# Patient Record
Sex: FEMALE | Race: OTHER | HISPANIC OR LATINO | Employment: OTHER | URBAN - METROPOLITAN AREA
[De-identification: names, ages, dates, MRNs, and addresses within clinical notes are randomized per-mention and may not be internally consistent; named-entity substitution may affect disease eponyms.]

---

## 2021-12-14 ENCOUNTER — APPOINTMENT (EMERGENCY)
Dept: RADIOLOGY | Facility: HOSPITAL | Age: 86
DRG: 436 | End: 2021-12-14
Payer: COMMERCIAL

## 2021-12-14 ENCOUNTER — HOSPITAL ENCOUNTER (INPATIENT)
Facility: HOSPITAL | Age: 86
LOS: 8 days | Discharge: HOME | DRG: 436 | End: 2021-12-22
Attending: EMERGENCY MEDICINE | Admitting: INTERNAL MEDICINE
Payer: COMMERCIAL

## 2021-12-14 ENCOUNTER — APPOINTMENT (EMERGENCY)
Dept: CT IMAGING | Facility: HOSPITAL | Age: 86
DRG: 436 | End: 2021-12-14
Payer: COMMERCIAL

## 2021-12-14 DIAGNOSIS — R17 JAUNDICE: Primary | ICD-10-CM

## 2021-12-14 DIAGNOSIS — K57.92 DIVERTICULITIS: ICD-10-CM

## 2021-12-14 DIAGNOSIS — R16.0 LIVER MASS: ICD-10-CM

## 2021-12-14 PROBLEM — Z86.73 HISTORY OF CVA (CEREBROVASCULAR ACCIDENT): Status: ACTIVE | Noted: 2020-08-03

## 2021-12-14 PROBLEM — R29.90 STROKE-LIKE SYMPTOMS: Status: ACTIVE | Noted: 2020-08-03

## 2021-12-14 PROBLEM — F32.A DEPRESSION: Status: ACTIVE | Noted: 2021-12-14

## 2021-12-14 PROBLEM — I48.91 ATRIAL FIBRILLATION (HCC): Status: ACTIVE | Noted: 2020-01-21

## 2021-12-14 LAB
2HR DELTA HS TROPONIN: -1 NG/L
ALBUMIN SERPL BCP-MCNC: 2.6 G/DL (ref 3.5–5)
ALP SERPL-CCNC: 771 U/L (ref 46–116)
ALT SERPL W P-5'-P-CCNC: 187 U/L (ref 12–78)
ANION GAP SERPL CALCULATED.3IONS-SCNC: 11 MMOL/L (ref 4–13)
APAP SERPL-MCNC: <2 UG/ML (ref 10–20)
AST SERPL W P-5'-P-CCNC: 155 U/L (ref 5–45)
ATRIAL RATE: 159 BPM
ATRIAL RATE: 214 BPM
BACTERIA UR QL AUTO: ABNORMAL /HPF
BASOPHILS # BLD AUTO: 0.06 THOUSANDS/ΜL (ref 0–0.1)
BASOPHILS NFR BLD AUTO: 1 % (ref 0–1)
BILIRUB DIRECT SERPL-MCNC: 5.03 MG/DL (ref 0–0.2)
BILIRUB SERPL-MCNC: 6.33 MG/DL (ref 0.2–1)
BILIRUB UR QL STRIP: ABNORMAL
BUN SERPL-MCNC: 44 MG/DL (ref 5–25)
CALCIUM SERPL-MCNC: 10.1 MG/DL (ref 8.3–10.1)
CARDIAC TROPONIN I PNL SERPL HS: 41 NG/L
CARDIAC TROPONIN I PNL SERPL HS: 42 NG/L
CHLORIDE SERPL-SCNC: 97 MMOL/L (ref 100–108)
CLARITY UR: CLEAR
CO2 SERPL-SCNC: 30 MMOL/L (ref 21–32)
COLOR UR: YELLOW
CREAT SERPL-MCNC: 0.69 MG/DL (ref 0.6–1.3)
EOSINOPHIL # BLD AUTO: 0.26 THOUSAND/ΜL (ref 0–0.61)
EOSINOPHIL NFR BLD AUTO: 2 % (ref 0–6)
ERYTHROCYTE [DISTWIDTH] IN BLOOD BY AUTOMATED COUNT: 16.2 % (ref 11.6–15.1)
GFR SERPL CREATININE-BSD FRML MDRD: 77 ML/MIN/1.73SQ M
GLUCOSE SERPL-MCNC: 117 MG/DL (ref 65–140)
GLUCOSE UR STRIP-MCNC: NEGATIVE MG/DL
HCT VFR BLD AUTO: 38.1 % (ref 34.8–46.1)
HGB BLD-MCNC: 12.2 G/DL (ref 11.5–15.4)
HGB UR QL STRIP.AUTO: ABNORMAL
IMM GRANULOCYTES # BLD AUTO: 0.18 THOUSAND/UL (ref 0–0.2)
IMM GRANULOCYTES NFR BLD AUTO: 2 % (ref 0–2)
KETONES UR STRIP-MCNC: NEGATIVE MG/DL
LEUKOCYTE ESTERASE UR QL STRIP: ABNORMAL
LYMPHOCYTES # BLD AUTO: 1.62 THOUSANDS/ΜL (ref 0.6–4.47)
LYMPHOCYTES NFR BLD AUTO: 13 % (ref 14–44)
MCH RBC QN AUTO: 28.4 PG (ref 26.8–34.3)
MCHC RBC AUTO-ENTMCNC: 32 G/DL (ref 31.4–37.4)
MCV RBC AUTO: 89 FL (ref 82–98)
MONOCYTES # BLD AUTO: 0.94 THOUSAND/ΜL (ref 0.17–1.22)
MONOCYTES NFR BLD AUTO: 8 % (ref 4–12)
NEUTROPHILS # BLD AUTO: 9.06 THOUSANDS/ΜL (ref 1.85–7.62)
NEUTS SEG NFR BLD AUTO: 74 % (ref 43–75)
NITRITE UR QL STRIP: NEGATIVE
NON-SQ EPI CELLS URNS QL MICRO: ABNORMAL /HPF
NRBC BLD AUTO-RTO: 0 /100 WBCS
NT-PROBNP SERPL-MCNC: 3168 PG/ML
PH UR STRIP.AUTO: 5 [PH] (ref 4.5–8)
PLATELET # BLD AUTO: 352 THOUSANDS/UL (ref 149–390)
PMV BLD AUTO: 11.4 FL (ref 8.9–12.7)
POTASSIUM SERPL-SCNC: 3 MMOL/L (ref 3.5–5.3)
PROT SERPL-MCNC: 8.4 G/DL (ref 6.4–8.2)
PROT UR STRIP-MCNC: NEGATIVE MG/DL
QRS AXIS: 57 DEGREES
QRS AXIS: 57 DEGREES
QRSD INTERVAL: 88 MS
QRSD INTERVAL: 90 MS
QT INTERVAL: 350 MS
QT INTERVAL: 350 MS
QTC INTERVAL: 403 MS
QTC INTERVAL: 408 MS
RBC # BLD AUTO: 4.29 MILLION/UL (ref 3.81–5.12)
RBC #/AREA URNS AUTO: ABNORMAL /HPF
SODIUM SERPL-SCNC: 138 MMOL/L (ref 136–145)
SP GR UR STRIP.AUTO: 1.01 (ref 1–1.03)
T WAVE AXIS: 104 DEGREES
T WAVE AXIS: 106 DEGREES
UROBILINOGEN UR QL STRIP.AUTO: 4 E.U./DL
VENTRICULAR RATE: 80 BPM
VENTRICULAR RATE: 82 BPM
WBC # BLD AUTO: 12.12 THOUSAND/UL (ref 4.31–10.16)
WBC #/AREA URNS AUTO: ABNORMAL /HPF

## 2021-12-14 PROCEDURE — 85025 COMPLETE CBC W/AUTO DIFF WBC: CPT | Performed by: EMERGENCY MEDICINE

## 2021-12-14 PROCEDURE — 36415 COLL VENOUS BLD VENIPUNCTURE: CPT | Performed by: EMERGENCY MEDICINE

## 2021-12-14 PROCEDURE — 71046 X-RAY EXAM CHEST 2 VIEWS: CPT

## 2021-12-14 PROCEDURE — 93005 ELECTROCARDIOGRAM TRACING: CPT

## 2021-12-14 PROCEDURE — 83880 ASSAY OF NATRIURETIC PEPTIDE: CPT | Performed by: EMERGENCY MEDICINE

## 2021-12-14 PROCEDURE — G1004 CDSM NDSC: HCPCS

## 2021-12-14 PROCEDURE — 99285 EMERGENCY DEPT VISIT HI MDM: CPT

## 2021-12-14 PROCEDURE — 80048 BASIC METABOLIC PNL TOTAL CA: CPT | Performed by: EMERGENCY MEDICINE

## 2021-12-14 PROCEDURE — 84484 ASSAY OF TROPONIN QUANT: CPT | Performed by: EMERGENCY MEDICINE

## 2021-12-14 PROCEDURE — 80076 HEPATIC FUNCTION PANEL: CPT | Performed by: EMERGENCY MEDICINE

## 2021-12-14 PROCEDURE — 74177 CT ABD & PELVIS W/CONTRAST: CPT

## 2021-12-14 PROCEDURE — 99285 EMERGENCY DEPT VISIT HI MDM: CPT | Performed by: EMERGENCY MEDICINE

## 2021-12-14 PROCEDURE — 99223 1ST HOSP IP/OBS HIGH 75: CPT | Performed by: INTERNAL MEDICINE

## 2021-12-14 PROCEDURE — 93010 ELECTROCARDIOGRAM REPORT: CPT | Performed by: INTERNAL MEDICINE

## 2021-12-14 PROCEDURE — 80143 DRUG ASSAY ACETAMINOPHEN: CPT | Performed by: EMERGENCY MEDICINE

## 2021-12-14 PROCEDURE — 81001 URINALYSIS AUTO W/SCOPE: CPT

## 2021-12-14 RX ORDER — CHOLECALCIFEROL (VITAMIN D3) 125 MCG
5 CAPSULE ORAL
COMMUNITY

## 2021-12-14 RX ORDER — ESCITALOPRAM OXALATE 10 MG/1
5 TABLET ORAL
Status: DISCONTINUED | OUTPATIENT
Start: 2021-12-14 | End: 2021-12-22 | Stop reason: HOSPADM

## 2021-12-14 RX ORDER — LANOLIN ALCOHOL/MO/W.PET/CERES
5 CREAM (GRAM) TOPICAL
Status: DISCONTINUED | OUTPATIENT
Start: 2021-12-14 | End: 2021-12-22 | Stop reason: HOSPADM

## 2021-12-14 RX ORDER — ONDANSETRON 2 MG/ML
4 INJECTION INTRAMUSCULAR; INTRAVENOUS EVERY 6 HOURS PRN
Status: DISCONTINUED | OUTPATIENT
Start: 2021-12-14 | End: 2021-12-22 | Stop reason: HOSPADM

## 2021-12-14 RX ORDER — ASPIRIN 81 MG/1
81 TABLET ORAL DAILY
COMMUNITY

## 2021-12-14 RX ORDER — ASPIRIN 81 MG/1
81 TABLET ORAL DAILY
Status: DISCONTINUED | OUTPATIENT
Start: 2021-12-15 | End: 2021-12-22 | Stop reason: HOSPADM

## 2021-12-14 RX ORDER — HYDROCHLOROTHIAZIDE 12.5 MG/1
1 TABLET ORAL DAILY
COMMUNITY
Start: 2021-11-23

## 2021-12-14 RX ORDER — POTASSIUM CHLORIDE 20 MEQ/1
40 TABLET, EXTENDED RELEASE ORAL ONCE
Status: COMPLETED | OUTPATIENT
Start: 2021-12-14 | End: 2021-12-14

## 2021-12-14 RX ORDER — METRONIDAZOLE 500 MG/1
500 TABLET ORAL EVERY 8 HOURS SCHEDULED
Status: DISCONTINUED | OUTPATIENT
Start: 2021-12-15 | End: 2021-12-22 | Stop reason: HOSPADM

## 2021-12-14 RX ORDER — ATORVASTATIN CALCIUM 10 MG/1
40 TABLET, FILM COATED ORAL DAILY
COMMUNITY
End: 2021-12-22 | Stop reason: HOSPADM

## 2021-12-14 RX ORDER — CHLORAL HYDRATE 500 MG
1 CAPSULE ORAL DAILY
COMMUNITY
Start: 2016-10-13

## 2021-12-14 RX ORDER — ESCITALOPRAM OXALATE 5 MG/1
5 TABLET ORAL
COMMUNITY
Start: 2021-11-19 | End: 2022-11-19

## 2021-12-14 RX ORDER — ATORVASTATIN CALCIUM 40 MG/1
40 TABLET, FILM COATED ORAL EVERY EVENING
Status: DISCONTINUED | OUTPATIENT
Start: 2021-12-15 | End: 2021-12-22 | Stop reason: HOSPADM

## 2021-12-14 RX ORDER — METRONIDAZOLE 500 MG/1
500 TABLET ORAL ONCE
Status: COMPLETED | OUTPATIENT
Start: 2021-12-14 | End: 2021-12-14

## 2021-12-14 RX ORDER — HYDROCHLOROTHIAZIDE 12.5 MG/1
12.5 TABLET ORAL DAILY
Status: DISCONTINUED | OUTPATIENT
Start: 2021-12-15 | End: 2021-12-22 | Stop reason: HOSPADM

## 2021-12-14 RX ADMIN — METOPROLOL TARTRATE 25 MG: 25 TABLET, FILM COATED ORAL at 22:28

## 2021-12-14 RX ADMIN — MELATONIN 4.5 MG: at 22:24

## 2021-12-14 RX ADMIN — POTASSIUM CHLORIDE 40 MEQ: 1500 TABLET, EXTENDED RELEASE ORAL at 22:24

## 2021-12-14 RX ADMIN — IOHEXOL 100 ML: 350 INJECTION, SOLUTION INTRAVENOUS at 15:29

## 2021-12-14 RX ADMIN — CEFEPIME HYDROCHLORIDE 2000 MG: 2 INJECTION, POWDER, FOR SOLUTION INTRAVENOUS at 17:31

## 2021-12-14 RX ADMIN — APIXABAN 2.5 MG: 2.5 TABLET, FILM COATED ORAL at 22:23

## 2021-12-14 RX ADMIN — METRONIDAZOLE 500 MG: 500 TABLET ORAL at 17:31

## 2021-12-14 RX ADMIN — ESCITALOPRAM OXALATE 5 MG: 10 TABLET ORAL at 22:24

## 2021-12-14 NOTE — ASSESSMENT & PLAN NOTE
This is an 41-year-old female with history of hypertension, atrial fibrillation, CVA, presenting with jaundice  Patient was most recently admitted at [facility] on 11/17/2021 with facial droop and confusion found to have acute right MCA stroke  Patient was not a candidate for tPA and thrombectomy  Patient was resumed on her Eliquis with no residual deficit noted  Family noted patient to be jaundiced over the last several days  Patient also having generalized abdominal tenderness, denies any nausea or vomiting  Denies any fever, chills      · CT revealed lobulated soft tissue mass centrally within the liver with additional smaller satellite lesions, no definite primary site of tumor is identified, may suggest cholangiocarcinoma  · Acetaminophen level normal  · Discussed with gastroenterology team, will continue with clear liquid diet, patient may need further workup with ERCP however GI team to discuss further with family  · Trend LFTs    Results from last 7 days   Lab Units 12/14/21  1407   AST U/L 155*   ALT U/L 187*   TOTAL BILIRUBIN mg/dL 6 33*

## 2021-12-14 NOTE — H&P
[address] [facility] 1933, 80 y o  female MRN: [id number]  Unit/Bed#: ED 31 Encounter: [id number]  Primary Care Provider: Jeremias Ortiz DO   Date and time admitted to hospital: 12/14/2021  1:10 PM    * Jaundice  Assessment & Plan  This is an 80-year-old female with history of hypertension, atrial fibrillation, CVA, presenting with jaundice  Patient was most recently admitted at [facility] on 11/17/2021 with facial droop and confusion found to have acute right MCA stroke  Patient was not a candidate for tPA and thrombectomy  Patient was resumed on her Eliquis with no residual deficit noted  Family noted patient to be jaundiced over the last several days  Patient also having generalized abdominal tenderness, denies any nausea or vomiting  Denies any fever, chills  · CT revealed lobulated soft tissue mass centrally within the liver with additional smaller satellite lesions, no definite primary site of tumor is identified, may suggest cholangiocarcinoma  · Acetaminophen level normal  · Discussed with gastroenterology team, will continue with clear liquid diet, patient may need further workup with ERCP however GI team to discuss further with family  · Trend LFTs    Results from last 7 days   Lab Units 12/14/21  1407   AST U/L 155*   ALT U/L 187*   TOTAL BILIRUBIN mg/dL 6 33*       Depression  Assessment & Plan  · Continue with escitalopram    Diverticulitis  Assessment & Plan  · CT scan revealed multiple colonic diverticula with infiltration of fat and sigmoid mesentery suggestive of diverticulitis, no evidence of perforation or abscess  · Continue with ceftriaxone and metronidazole  · Tolerating clear liquid diet    History of CVA (cerebrovascular accident)  Assessment & Plan  · Recent right MCA infarct at [facility] 11/2021  · No residual deficits  · Maintained on aspirin, Eliquis, statin    Essential hypertension  Assessment & Plan  · Continue metoprolol 25 mg b i d   And hydrochlorothiazide 12 5 mg daily    Atrial fibrillation (HCC)  Assessment & Plan  · Maintained on metoprolol for rate control  · Eliquis for anticoagulation        VTE Prophylaxis: Apixaban (Eliquis)  / sequential compression device   Code Status: FULL  POLST: There is no POLST form on file for this patient (pre-hospital)    Anticipated Length of Stay:  Patient will be admitted on an Inpatient basis with an anticipated length of stay of  Greater than 2 midnights  Justification for Hospital Stay: jaundice    Total Time for Visit, including Counseling / Coordination of Care: 45 minutes  Greater than 50% of this total time spent on direct patient counseling and coordination of care  Chief Complaint:   jaundice    History of Present Illness:    Zan Gibbs is a 80 y o  female who presents with jaundice  Patient hashistory of hypertension, atrial fibrillation, CVA, presenting with jaundice  Patient was most recently admitted at [facility] on 11/17/2021 with facial droop and confusion found to have acute right MCA stroke  Patient was not a candidate for tPA and thrombectomy  Patient was resumed on her Eliquis with no residual deficit noted  Family noted patient to be jaundiced over the last several days  Patient also having generalized abdominal tenderness, denies any nausea or vomiting  Denies any fever, chills  Review of Systems:    Review of Systems   Constitutional: Negative  HENT: Negative  Eyes: Negative  Respiratory: Negative  Cardiovascular: Negative  Gastrointestinal: Positive for abdominal pain  Endocrine: Negative  Genitourinary: Negative  Musculoskeletal: Negative  Skin: Positive for color change  Allergic/Immunologic: Negative  Neurological: Negative  Hematological: Negative  Psychiatric/Behavioral: Negative          Past Medical and Surgical History:     Past Medical History:   Diagnosis Date    Hypertension     Stroke [facility])        Past Surgical History: Procedure Laterality Date    CARDIAC PACEMAKER PLACEMENT         Meds/Allergies:    Prior to Admission medications    Medication Sig Start Date End Date Taking? Authorizing Provider   apixaban (Eliquis) 2 5 mg Take 2 5 mg by mouth 2 (two) times a day   Yes Historical Provider, MD   aspirin (ECOTRIN LOW STRENGTH) 81 mg EC tablet Take 81 mg by mouth daily   Yes Historical Provider, MD   atorvastatin (LIPITOR) 10 mg tablet Take 40 mg by mouth daily   Yes Historical Provider, MD   escitalopram (LEXAPRO) 5 mg tablet Take 5 mg by mouth daily at bedtime 11/19/21 11/19/22 Yes Historical Provider, MD   hydrochlorothiazide (HYDRODIURIL) 12 5 mg tablet Take 1 tablet by mouth daily 11/23/21  Yes Historical Provider, MD   Melatonin 5 MG TABS Take 5 mg by mouth daily at bedtime   Yes Historical Provider, MD   metoprolol tartrate (LOPRESSOR) 25 mg tablet Take 1 tablet by mouth 2 (two) times a day 11/2/21  Yes Historical Provider, MD   [place]-3 1000 MG CAPS Take 1 capsule by mouth daily 10/13/16  Yes Historical Provider, MD   psyllium (METAMUCIL) 0 52 g capsule Take 0 52 g by mouth daily   Yes Historical Provider, MD     I have reviewed home medications with patient family member  Allergies: Allergies   Allergen Reactions    Penicillins Hives       Social History:     Social History     Substance and Sexual Activity   Alcohol Use Never     Social History     Tobacco Use   Smoking Status Never Smoker   Smokeless Tobacco Never Used     Social History     Substance and Sexual Activity   Drug Use Never       Family History:    History reviewed  No pertinent family history      Physical Exam:     Vitals:   Blood Pressure: 155/65 (12/14/21 1602)  Pulse: 90 (12/14/21 1602)  Temperature: 97 9 °F (36 6 °C) (12/14/21 1331)  Temp Source: Oral (12/14/21 1331)  Respirations: 16 (12/14/21 1602)  SpO2: 93 % (12/14/21 1602)    Constitutional: Patient is oriented to person, place and time, no acute distress  HEENT:  Normocephalic, atraumatic, scleral icterus  Cardiovascular: Normal S1S2, RRR, No murmurs/rubs/gallops appreciated  Pulmonary:  Bilateral air entry, No rhonchi/rales/wheezing appreciated  Abdominal: Soft, Bowel sounds present, mild diffuse tenderness  Extremities:  No cyanosis, clubbing or edema  Neurological: Cranial nerves II-XII grossly intact, sensation intact, otherwise no focal neurological symptoms  Skin:  Scattered jaundice    Additional Data:     Lab Results: I have personally reviewed pertinent reports  Results from last 7 days   Lab Units 12/14/21  1407   WBC Thousand/uL 12 12*   HEMOGLOBIN g/dL 12 2   HEMATOCRIT % 38 1   PLATELETS Thousands/uL 352   NEUTROS PCT % 74   LYMPHS PCT % 13*   MONOS PCT % 8   EOS PCT % 2     Results from last 7 days   Lab Units 12/14/21  1407   POTASSIUM mmol/L 3 0*   CHLORIDE mmol/L 97*   CO2 mmol/L 30   BUN mg/dL 44*   CREATININE mg/dL 0 69   CALCIUM mg/dL 10 1   ALK PHOS U/L 771*   ALT U/L 187*   AST U/L 155*           Imaging: I have personally reviewed pertinent reports  CT abdomen pelvis with contrast    Result Date: 12/14/2021  Narrative: CT ABDOMEN AND PELVIS WITH IV CONTRAST INDICATION:   jaundice, back pain  COMPARISON:  None  TECHNIQUE:  CT examination of the abdomen and pelvis was performed  Axial, sagittal, and coronal 2D reformatted images were created from the source data and submitted for interpretation  Radiation dose length product (DLP) for this visit:  712 mGy-cm   This examination, like all CT scans performed in the [facility], was performed utilizing techniques to minimize radiation dose exposure, including the use of iterative reconstruction and automated exposure control  IV Contrast:  100 mL of iohexol (OMNIPAQUE) Enteric Contrast:  Enteric contrast was not administered   FINDINGS: ABDOMEN LOWER CHEST:  No clinically significant abnormality identified in the visualized lower chest  LIVER/BILIARY TREE:  There is a lobulated mass in segment 4 of the liver which measures approximately 6 1 x 4 1 cm  Additional smaller satellite lesions are also present in the left lateral segment and probable smaller lesions in segment 5 on images 27 and 30 of series 2  The largest lesion shows some filling with contrast on the delayed images  There is intrahepatic biliary ductal dilatation mostly in the left lateral segment of the liver  GALLBLADDER:  Gallstones  SPLEEN:  Unremarkable  PANCREAS:  Unremarkable  ADRENAL GLANDS:  Unremarkable  KIDNEYS/URETERS:  Unremarkable  No hydronephrosis  STOMACH AND BOWEL:  There is a curvilinear radiopaque density in the rectum best shown on series 2 images 55 through 61 and series 602 image 554 of uncertain etiology  No free air or evidence of perforation  Multiple colonic diverticula are present throughout the entire length of the colon, with infiltration of the fat in the sigmoid mesial colon, best shown on series 2 image 48  Small bowel is normal in caliber  APPENDIX:  No findings to suggest appendicitis  ABDOMINOPELVIC CAVITY:  No ascites  No pneumoperitoneum  No lymphadenopathy  VESSELS:  Atherosclerotic changes are present  No evidence of aneurysm  PELVIS REPRODUCTIVE ORGANS:  Nearly 3 cm probable fibroid in the left uterine fundus  URINARY BLADDER:  Unremarkable  ABDOMINAL WALL/INGUINAL REGIONS:  Fat-containing umbilical hernia  There is an anterior abdominal wall lipoma in the left lower quadrant between the internal and external oblique muscles  OSSEOUS STRUCTURES:  Degenerative changes in spine  No destructive skeletal lesions  Impression: 1  Lobulated low-attenuation soft tissue mass centrally within the liver with additional smaller satellite lesions  No definite primary site of tumor is identified  The enhancement pattern and presence of intrahepatic biliary ductal dilatation is nonspecific but might suggest cholangiocarcinoma as a possibility   2   Intraluminal curvilinear radiopaque foreign body at the rectosigmoid junction of uncertain etiology  No evidence of perforation  3   Multiple colonic diverticula, with infiltration of the fat in the sigmoid mesentery suggestive of diverticulitis  No colon mass is identified  No evidence of perforation or abscess  Workstation performed: [id number]       EKG, Pathology, and Other Studies Reviewed on Admission:   · EKG:  Atrial fibrillation    Allscripts / Epic Records Reviewed: Yes     ** Please Note: This note has been constructed using a voice recognition system   **

## 2021-12-14 NOTE — ED PROVIDER NOTES
History  Chief Complaint   Patient presents with    Jaundice     pt arrives via ems per report pt was noted by snf staff jaundice, dark urine and altered mental status  pt c/o sob, pt alert and oriented  An 77-year-old female with past medical history of brain aneurysm, CVA, prediabetes, cardiomyopathy, atrial fibrillation (on Eliquis), heart failure, hypertension and tachy-indigo syndrome s/p pacemaker; presents with jaundice  History is provided predominantly from the patient's son who is at bedside and providing translation  Son states that patient's physical therapist arrived to the house today and noticed her to be jaundice which prompted ED evaluation  Son states today is the first day the jaundice was noted  Patient does complain of generalized back pain, however felt it was secondary to increased activity with physical therapy  Patient also notes chest pain and dyspnea on exertion, also stating this occurs with activity  Patient otherwise denies fever, chills, headache, abdominal pain, abdominal distension, nausea, vomiting, diarrhea, peripheral edema and rashes  Of note, patient was admitted to [facility] from 11/17-19 for an acute CVA  Patient was discharged home to live with her granddaughter, reporting that she has been doing very well with home therapy  A/P:  Jaundice, associated with back pain  Mild generalized abdominal tenderness  Concern for obstructive pathology, predominantly pancreatic malignancy  Less likely toxic in nature, however patient does report taking Tylenol daily  Will check lab work including LFTs and acetaminophen level  Will check CTAP for further evaluation  History provided by:  Patient, relative and medical records   used: Yes (family member)        Prior to Admission Medications   Prescriptions Last Dose Informant Patient Reported? Taking?    Melatonin 5 MG TABS   Yes Yes   Sig: Take 5 mg by mouth daily at bedtime   [place]-3 1000 MG CAPS   Yes Yes   Sig: Take 1 capsule by mouth daily   apixaban (Eliquis) 2 5 mg   Yes Yes   Sig: Take 2 5 mg by mouth 2 (two) times a day   aspirin (ECOTRIN LOW STRENGTH) 81 mg EC tablet   Yes Yes   Sig: Take 81 mg by mouth daily   atorvastatin (LIPITOR) 10 mg tablet   Yes Yes   Sig: Take 40 mg by mouth daily   escitalopram (LEXAPRO) 5 mg tablet   Yes Yes   Sig: Take 5 mg by mouth daily at bedtime   hydrochlorothiazide (HYDRODIURIL) 12 5 mg tablet   Yes Yes   Sig: Take 1 tablet by mouth daily   metoprolol tartrate (LOPRESSOR) 25 mg tablet   Yes Yes   Sig: Take 1 tablet by mouth 2 (two) times a day   psyllium (METAMUCIL) 0 52 g capsule   Yes Yes   Sig: Take 0 52 g by mouth daily      Facility-Administered Medications: None       Past Medical History:   Diagnosis Date    Hypertension     Stroke [facility])        Past Surgical History:   Procedure Laterality Date    CARDIAC PACEMAKER PLACEMENT         History reviewed  No pertinent family history  I have reviewed and agree with the history as documented  E-Cigarette/Vaping    E-Cigarette Use Never User      E-Cigarette/Vaping Substances     Social History     Tobacco Use    Smoking status: Never Smoker    Smokeless tobacco: Never Used   Vaping Use    Vaping Use: Never used   Substance Use Topics    Alcohol use: Never    Drug use: Never       Review of Systems   Respiratory: Positive for shortness of breath  Cardiovascular: Positive for chest pain  Musculoskeletal: Positive for back pain  Skin: Positive for color change  All other systems reviewed and are negative  Physical Exam  Physical Exam  General Appearance: alert and oriented, nad, non toxic appearing  Skin:  Warm, dry  Jaundice appreciated  HEENT: atraumatic, normocephalic    Scleral icterus  Neck: Supple, trachea midline  Cardiac: RRR; no murmurs, rub, gallops  Pulmonary: lungs CTAB; no wheezes, rales, rhonchi  Gastrointestinal: abdomen soft, mild generalized tenderness, nondistended; no guarding or rebound tenderness; good bowel sounds, no mass or bruits  Extremities:  Midline thoracic and lumbar spinal tenderness    2+ pitting edema to bilateral lower extremities, 2+ pulses; no calf tenderness, no clubbing, no cyanosis  Neuro:  no focal motor or sensory deficits, CN 2-12 grossly intact  Psych:  Normal mood and affect, normal judgement and insight      Vital Signs  ED Triage Vitals [12/14/21 1331]   Temperature Pulse Respirations Blood Pressure SpO2   97 9 °F (36 6 °C) 72 16 161/74 93 %      Temp Source Heart Rate Source Patient Position - Orthostatic VS BP Location FiO2 (%)   Oral Monitor Sitting Right arm --      Pain Score       No Pain           Vitals:    12/14/21 1331 12/14/21 1602   BP: 161/74 155/65   Pulse: 72 90   Patient Position - Orthostatic VS: Sitting Lying         Visual Acuity      ED Medications  Medications   cefepime (MAXIPIME) 2 g/50 mL dextrose IVPB (2,000 mg Intravenous New Bag 12/14/21 1731)   iohexol (OMNIPAQUE) 350 MG/ML injection (SINGLE-DOSE) 100 mL (100 mL Intravenous Given 12/14/21 1529)   metroNIDAZOLE (FLAGYL) tablet 500 mg (500 mg Oral Given 12/14/21 1731)       Diagnostic Studies  Results Reviewed     Procedure Component Value Units Date/Time    Urine Macroscopic, POC [[id number]]  (Abnormal) Collected: 12/14/21 1744    Lab Status: Final result Specimen: Urine Updated: 12/14/21 1745     Color, UA Yellow     Clarity, UA Clear     pH, UA 5 0     Leukocytes, UA Trace     Nitrite, UA Negative     Protein, UA Negative mg/dl      Glucose, UA Negative mg/dl      Ketones, UA Negative mg/dl      Urobilinogen, UA 4 0 E U /dl      Bilirubin, UA Interference- unable to analyze     Blood, UA Trace     Specific Jet, UA 1 010    Narrative:      CLINITEK RESULT    Urine Microscopic [[id number]] Collected: 12/14/21 1744    Lab Status: No result Specimen: Urine, Clean Catch     HS Troponin I 2hr [[id number]] Collected: 12/14/21 1602    Lab Status: Final result Specimen: Blood from Arm, Left Updated: 12/14/21 1637     hs TnI 2hr 41 ng/L      Delta 2hr hsTnI -1 ng/L     HS Troponin I 4hr [[id number]]     Lab Status: No result Specimen: Blood     Acetaminophen level-"If concentration is detectable, please discuss with medical  on call " [[id number]]  (Abnormal) Collected: 12/14/21 1407    Lab Status: Final result Specimen: Blood from Arm, Left Updated: 12/14/21 1455     Acetaminophen Level <2 ug/mL     Hepatic function panel [[id number]]  (Abnormal) Collected: 12/14/21 1407    Lab Status: Final result Specimen: Blood from Arm, Left Updated: 12/14/21 1450     Total Bilirubin 6 33 mg/dL      Bilirubin, Direct 5 03 mg/dL      Alkaline Phosphatase 771 U/L       U/L       U/L      Total Protein 8 4 g/dL      Albumin 2 6 g/dL     NT-BNP PRO [[id number]]  (Abnormal) Collected: 12/14/21 1407    Lab Status: Final result Specimen: Blood from Arm, Left Updated: 12/14/21 1450     NT-proBNP 3,168 pg/mL     HS Troponin 0hr (reflex protocol) [[id number]]  (Normal) Collected: 12/14/21 1407    Lab Status: Final result Specimen: Blood from Arm, Left Updated: 12/14/21 1436     hs TnI 0hr 42 ng/L     Basic metabolic panel [[id number]]  (Abnormal) Collected: 12/14/21 1407    Lab Status: Final result Specimen: Blood from Arm, Left Updated: 12/14/21 1435     Sodium 138 mmol/L      Potassium 3 0 mmol/L      Chloride 97 mmol/L      CO2 30 mmol/L      ANION GAP 11 mmol/L      BUN 44 mg/dL      Creatinine 0 69 mg/dL      Glucose 117 mg/dL      Calcium 10 1 mg/dL      eGFR 77 ml/min/1 73sq m     Narrative:      [place] guidelines for Chronic Kidney Disease (CKD):     Stage 1 with normal or high GFR (GFR > 90 mL/min/1 73 square meters)    Stage 2 Mild CKD (GFR = 60-89 mL/min/1 73 square meters)    Stage 3A Moderate CKD (GFR = 45-59 mL/min/1 73 square meters)    Stage 3B Moderate CKD (GFR = 30-44 mL/min/1 73 square meters)    Stage 4 Severe CKD (GFR = 15-29 mL/min/1 73 square meters)    Stage 5 End Stage CKD (GFR <15 mL/min/1 73 square meters)  Note: GFR calculation is accurate only with a steady state creatinine    CBC and differential [[id number]]  (Abnormal) Collected: 12/14/21 1407    Lab Status: Final result Specimen: Blood from Arm, Left Updated: 12/14/21 1412     WBC 12 12 Thousand/uL      RBC 4 29 Million/uL      Hemoglobin 12 2 g/dL      Hematocrit 38 1 %      MCV 89 fL      MCH 28 4 pg      MCHC 32 0 g/dL      RDW 16 2 %      MPV 11 4 fL      Platelets 839 Thousands/uL      nRBC 0 /100 WBCs      Neutrophils Relative 74 %      Immat GRANS % 2 %      Lymphocytes Relative 13 %      Monocytes Relative 8 %      Eosinophils Relative 2 %      Basophils Relative 1 %      Neutrophils Absolute 9 06 Thousands/µL      Immature Grans Absolute 0 18 Thousand/uL      Lymphocytes Absolute 1 62 Thousands/µL      Monocytes Absolute 0 94 Thousand/µL      Eosinophils Absolute 0 26 Thousand/µL      Basophils Absolute 0 06 Thousands/µL                  CT abdomen pelvis with contrast   Final Result by Billie Shen MD (12/14 7671)         1  Lobulated low-attenuation soft tissue mass centrally within the liver with additional smaller satellite lesions  No definite primary site of tumor is identified  The enhancement pattern and presence of intrahepatic biliary ductal dilatation is    nonspecific but might suggest cholangiocarcinoma as a possibility  2   Intraluminal curvilinear radiopaque foreign body at the rectosigmoid junction of uncertain etiology  No evidence of perforation  3   Multiple colonic diverticula, with infiltration of the fat in the sigmoid mesentery suggestive of diverticulitis  No colon mass is identified  No evidence of perforation or abscess        Workstation performed: [id number]         XR chest 2 views   ED Interpretation by Fabio Howell DO (12/14 1371)   Cardiomegaly, otherwise no acute disease                 Procedures  Procedures   ECG 12 Lead Documentation  Date/Time: today/date: 12/14/2021  Performed by: Lashanda Medley    ECG reviewed by me, the ED Provider: yes    Patient location:  ED   Previous ECG:  No old for comparison   Rate:  80  ECG rate assessment: normal    Rhythm: atrial fibrillation    Ectopy:  none    QRS axis:  Normal  Intervals: normal   Q waves: None   ST segments:  Normal  T waves: normal      Impression: Atrial fibrillation, otherwise normal EKG          ED Course  ED Course as of 12/14/21 1755   Tue Dec 14, 2021   1457 TOTAL BILIRUBIN(!): 6 33  Direct bilirubin 5 03, with elevated LFT's  Acetaminophen undetectable  Likely obstructive pathology   1645 Delta 2hr hsTnI: -1   1647 CT abdomen pelvis with contrast  1 ) Lobulated low-attenuation soft tissue mass centrally within the liver with additional smaller satellite lesions  No definite primary site of tumor is identified  The enhancement pattern and presence of intrahepatic biliary ductal dilatation is nonspecific but might suggest cholangiocarcinoma as a possibility  2 ) Intraluminal curvilinear radiopaque foreign body at the rectosigmoid junction of uncertain etiology  No evidence of perforation  3 ) Multiple colonic diverticula, with infiltration of the fat in the sigmoid mesentery suggestive of diverticulitis  No colon mass is identified  No evidence of perforation or abscess  65 Pt and son updated on CT results, agreeable to admission for further evaluation/treatment  In regards to diverticulitis on CT scan, pt denies LLQ abd pain however is locally tender to LLQ  Will also treat for diverticulitis  Pt denies history of abdominal surgeries, unclear etiology of possible foreign body  [address], Dr Antonio Joshua, discussing pt's presentation and results  Will admit to their service  [address] [place] [address] with Dr Chris Smith, GI fellow, reviewing presentation and work up  Plan for ERCP tomorrow, will make NPO at midnight  SLIM updated  MDM    Disposition  Final diagnoses:   Jaundice   Liver mass   Diverticulitis     Time reflects when diagnosis was documented in both MDM as applicable and the Disposition within this note     Time User Action Codes Description Comment    12/14/2021  5:25 PM Seda Moore Add [R17] Jaundice     12/14/2021  5:25 PM Seda Moore Add [R16 0] Liver mass     12/14/2021  5:25 PM Inés Rdz Add [K57 92] Diverticulitis       ED Disposition     ED Disposition Condition Date/Time Comment    Admit Stable Tue Dec 14, 2021  5:25 PM Case was discussed with FABIOLA and the patient's admission status was agreed to be Admission Status: inpatient status to the service of Dr Subha Perez   Follow-up Information    None         Patient's Medications   Discharge Prescriptions    No medications on file       No discharge procedures on file      PDMP Review     None          ED Provider  Electronically Signed by           Autumn Lazo DO  12/14/21 6142

## 2021-12-14 NOTE — ASSESSMENT & PLAN NOTE
· CT scan revealed multiple colonic diverticula with infiltration of fat and sigmoid mesentery suggestive of diverticulitis, no evidence of perforation or abscess  · Continue with ceftriaxone and metronidazole  · Tolerating clear liquid diet

## 2021-12-15 LAB
4HR DELTA HS TROPONIN: 0 NG/L
ALBUMIN SERPL BCP-MCNC: 2.1 G/DL (ref 3.5–5)
ALP SERPL-CCNC: 623 U/L (ref 46–116)
ALT SERPL W P-5'-P-CCNC: 131 U/L (ref 12–78)
ANION GAP SERPL CALCULATED.3IONS-SCNC: 11 MMOL/L (ref 4–13)
AST SERPL W P-5'-P-CCNC: 106 U/L (ref 5–45)
BASOPHILS # BLD AUTO: 0.04 THOUSANDS/ΜL (ref 0–0.1)
BASOPHILS NFR BLD AUTO: 0 % (ref 0–1)
BILIRUB SERPL-MCNC: 4.81 MG/DL (ref 0.2–1)
BUN SERPL-MCNC: 39 MG/DL (ref 5–25)
CALCIUM ALBUM COR SERPL-MCNC: 11.1 MG/DL (ref 8.3–10.1)
CALCIUM SERPL-MCNC: 9.6 MG/DL (ref 8.3–10.1)
CARDIAC TROPONIN I PNL SERPL HS: 42 NG/L
CHLORIDE SERPL-SCNC: 102 MMOL/L (ref 100–108)
CO2 SERPL-SCNC: 27 MMOL/L (ref 21–32)
CREAT SERPL-MCNC: 0.69 MG/DL (ref 0.6–1.3)
EOSINOPHIL # BLD AUTO: 0.18 THOUSAND/ΜL (ref 0–0.61)
EOSINOPHIL NFR BLD AUTO: 2 % (ref 0–6)
ERYTHROCYTE [DISTWIDTH] IN BLOOD BY AUTOMATED COUNT: 16.5 % (ref 11.6–15.1)
GFR SERPL CREATININE-BSD FRML MDRD: 77 ML/MIN/1.73SQ M
GLUCOSE SERPL-MCNC: 96 MG/DL (ref 65–140)
HCT VFR BLD AUTO: 32.5 % (ref 34.8–46.1)
HGB BLD-MCNC: 10.5 G/DL (ref 11.5–15.4)
IMM GRANULOCYTES # BLD AUTO: 0.12 THOUSAND/UL (ref 0–0.2)
IMM GRANULOCYTES NFR BLD AUTO: 1 % (ref 0–2)
LYMPHOCYTES # BLD AUTO: 1.5 THOUSANDS/ΜL (ref 0.6–4.47)
LYMPHOCYTES NFR BLD AUTO: 14 % (ref 14–44)
MCH RBC QN AUTO: 27.5 PG (ref 26.8–34.3)
MCHC RBC AUTO-ENTMCNC: 32.3 G/DL (ref 31.4–37.4)
MCV RBC AUTO: 85 FL (ref 82–98)
MONOCYTES # BLD AUTO: 0.87 THOUSAND/ΜL (ref 0.17–1.22)
MONOCYTES NFR BLD AUTO: 8 % (ref 4–12)
NEUTROPHILS # BLD AUTO: 7.87 THOUSANDS/ΜL (ref 1.85–7.62)
NEUTS SEG NFR BLD AUTO: 75 % (ref 43–75)
NRBC BLD AUTO-RTO: 0 /100 WBCS
PLATELET # BLD AUTO: 329 THOUSANDS/UL (ref 149–390)
PMV BLD AUTO: 10.6 FL (ref 8.9–12.7)
POTASSIUM SERPL-SCNC: 3.7 MMOL/L (ref 3.5–5.3)
PROT SERPL-MCNC: 6.9 G/DL (ref 6.4–8.2)
RBC # BLD AUTO: 3.82 MILLION/UL (ref 3.81–5.12)
SODIUM SERPL-SCNC: 140 MMOL/L (ref 136–145)
WBC # BLD AUTO: 10.58 THOUSAND/UL (ref 4.31–10.16)

## 2021-12-15 PROCEDURE — 85025 COMPLETE CBC W/AUTO DIFF WBC: CPT | Performed by: INTERNAL MEDICINE

## 2021-12-15 PROCEDURE — 99232 SBSQ HOSP IP/OBS MODERATE 35: CPT | Performed by: HOSPITALIST

## 2021-12-15 PROCEDURE — 99223 1ST HOSP IP/OBS HIGH 75: CPT | Performed by: INTERNAL MEDICINE

## 2021-12-15 PROCEDURE — 84484 ASSAY OF TROPONIN QUANT: CPT | Performed by: INTERNAL MEDICINE

## 2021-12-15 PROCEDURE — 80053 COMPREHEN METABOLIC PANEL: CPT | Performed by: INTERNAL MEDICINE

## 2021-12-15 RX ORDER — ACETAMINOPHEN 325 MG/1
975 TABLET ORAL EVERY 6 HOURS PRN
Status: DISCONTINUED | OUTPATIENT
Start: 2021-12-15 | End: 2021-12-15

## 2021-12-15 RX ORDER — IBUPROFEN 400 MG/1
400 TABLET ORAL ONCE
Status: COMPLETED | OUTPATIENT
Start: 2021-12-15 | End: 2021-12-15

## 2021-12-15 RX ADMIN — ESCITALOPRAM OXALATE 5 MG: 10 TABLET ORAL at 21:05

## 2021-12-15 RX ADMIN — METRONIDAZOLE 500 MG: 500 TABLET ORAL at 21:05

## 2021-12-15 RX ADMIN — ATORVASTATIN CALCIUM 40 MG: 40 TABLET, FILM COATED ORAL at 17:55

## 2021-12-15 RX ADMIN — ASPIRIN 81 MG: 81 TABLET, COATED ORAL at 10:04

## 2021-12-15 RX ADMIN — METRONIDAZOLE 500 MG: 500 TABLET ORAL at 14:18

## 2021-12-15 RX ADMIN — CEFTRIAXONE SODIUM 1000 MG: 10 INJECTION, POWDER, FOR SOLUTION INTRAVENOUS at 00:57

## 2021-12-15 RX ADMIN — MELATONIN 4.5 MG: at 21:05

## 2021-12-15 RX ADMIN — METRONIDAZOLE 500 MG: 500 TABLET ORAL at 05:39

## 2021-12-15 RX ADMIN — METOPROLOL TARTRATE 25 MG: 25 TABLET, FILM COATED ORAL at 17:55

## 2021-12-15 RX ADMIN — IBUPROFEN 400 MG: 400 TABLET ORAL at 21:23

## 2021-12-15 RX ADMIN — HYDROCHLOROTHIAZIDE 12.5 MG: 12.5 TABLET ORAL at 10:04

## 2021-12-15 RX ADMIN — APIXABAN 2.5 MG: 2.5 TABLET, FILM COATED ORAL at 10:04

## 2021-12-15 RX ADMIN — METOPROLOL TARTRATE 25 MG: 25 TABLET, FILM COATED ORAL at 10:04

## 2021-12-15 NOTE — UTILIZATION REVIEW
Initial Clinical Review    Admission: Date/Time/Statement:   Admission Orders (From admission, onward)     Ordered        12/14/21 1726  Inpatient Admission  Once                      Orders Placed This Encounter   Procedures    Inpatient Admission     Standing Status:   Standing     Number of Occurrences:   1     Order Specific Question:   Level of Care     Answer:   Med Surg [16]     Order Specific Question:   Estimated length of stay     Answer:   More than 2 Midnights     Order Specific Question:   Certification     Answer:   I certify that inpatient services are medically necessary for this patient for a duration of greater than two midnights  See H&P and MD Progress Notes for additional information about the patient's course of treatment  ED Arrival Information     Expected Arrival Acuity    - 12/14/2021 13:03 Urgent         Means of arrival Escorted by Service Admission type    [facility] ([address]) Hospitalist Urgent         Arrival complaint    medical problem        Chief Complaint   Patient presents with    Jaundice     pt arrives via ems per report pt was noted by snf staff jaundice, dark urine and altered mental status  pt c/o sob, pt alert and oriented  Initial Presentation: 69-year-old female with history of hypertension, atrial fibrillation, CVA dx 11/2021 on Eliquis, presenting to ED  from home via EMS with jaundice x several days the patient reports generalized abdominal tenderness  On exam, abdomen soft, bowel sounds notes, mild diffuse abdominal tenderness  Labs-, , alk phos 771, and t bili 6 33  Potassium 3 0  Acetaminophen level normal  WBC 12 12  CTA/P reveals lobulated mass within liver with smaller satellite lesions suggestive of cholangiocarcinoma, also suggestive of diverticulitis     Pt given IV/po abx in ED  Pt admitted as Inpatient with jaundice , elevate LFT's, diverticulitis  Plan - GI consult, trend LFT's, CL diet  IV ceftriaxone, po Flagyl  Date: 12/15  Day 2:   GI -jaundice, lesions throughout her liver with ductal dilation, and elevated liver enzymes :, , alk phos 623, and t bili 4 81-downtrending from admission  Possible next steps of MRCP or ERCP after attending discusses CT findings with family  If proceed with ERCP, need to hold Eliquis x 2 days  Continue abx for diverticulitis  Medicine-Pt continues with pain all over her abdomen but feels it is tolerable  Tender to palpation over liver, skin jaundiced  Hold eliquis for upcoming procedure- Waiting for GI opinion on how to proceed with either MRCP, ERCP or other biopsy  Pt remains on CL diet  WBC 10 58, pt afebrile  ED Triage Vitals [12/14/21 1331]   Temperature Pulse Respirations Blood Pressure SpO2   97 9 °F (36 6 °C) 72 16 161/74 93 %      Temp Source Heart Rate Source Patient Position - Orthostatic VS BP Location FiO2 (%)   Oral Monitor Sitting Right arm --      Pain Score       No Pain          Wt Readings from Last 1 Encounters:   10/13/16 69 5 kg (153 lb 4 oz)     Additional Vital Signs:   Date/Time Temp Pulse Resp BP MAP (mmHg) SpO2   12/15/21 07:32:21 97 8 °F (36 6 °C) 99 18 131/62 85 95 %   12/14/21 22:40:16 97 8 °F (36 6 °C) 95 18 128/60 83 96 %   12/14/21 22:24:59 -- 104 -- 133/56 82 95 %   12/14/21 19:55:53 97 7 °F (36 5 °C) 80 16 124/61 82 93 %   12/14/21 19:55:01 97 7 °F (36 5 °C) 94 16 124/61 82 94 %   12/14/21 1602 -- 90 16 155/65 -- 93 %     Pertinent Labs/Diagnostic Test Results:   12/14  ECGAtrial fibrillation with premature ventricular or aberrantly conducted complexes  Nonspecific ST abnormality  V rate -80  CXR-No acute cardiopulmonary disease  CT Abdomen-1  Lobulated low-attenuation soft tissue mass centrally within the liver with additional smaller satellite lesions  No definite primary site of tumor is identified    The enhancement pattern and presence of intrahepatic biliary ductal dilatation is nonspecific but might suggest cholangiocarcinoma as a possibility  2   Intraluminal curvilinear radiopaque foreign body at the rectosigmoid junction of uncertain etiology  No evidence of perforation  3   Multiple colonic diverticula, with infiltration of the fat in the sigmoid mesentery suggestive of diverticulitis  No colon mass is identified  No evidence of perforation or abscess          Results from last 7 days   Lab Units 12/15/21  0448 12/14/21  1407   WBC Thousand/uL 10 58* 12 12*   HEMOGLOBIN g/dL 10 5* 12 2   HEMATOCRIT % 32 5* 38 1   PLATELETS Thousands/uL 329 352   NEUTROS ABS Thousands/µL 7 87* 9 06*         Results from last 7 days   Lab Units 12/15/21  0448 12/14/21  1407   SODIUM mmol/L 140 138   POTASSIUM mmol/L 3 7 3 0*   CHLORIDE mmol/L 102 97*   CO2 mmol/L 27 30   ANION GAP mmol/L 11 11   BUN mg/dL 39* 44*   CREATININE mg/dL 0 69 0 69   EGFR ml/min/1 73sq m 77 77   CALCIUM mg/dL 9 6 10 1     Results from last 7 days   Lab Units 12/15/21  0448 12/14/21  1407   AST U/L 106* 155*   ALT U/L 131* 187*   ALK PHOS U/L 623* 771*   TOTAL PROTEIN g/dL 6 9 8 4*   ALBUMIN g/dL 2 1* 2 6*   TOTAL BILIRUBIN mg/dL 4 81* 6 33*   BILIRUBIN DIRECT mg/dL  --  5 03*         Results from last 7 days   Lab Units 12/15/21  0448 12/14/21  1407   GLUCOSE RANDOM mg/dL 96 117               Results from last 7 days   Lab Units 12/15/21  0448 12/14/21  1602 12/14/21  1407   HS TNI 0HR ng/L  --   --  42   HS TNI 2HR ng/L  --  41  --    HSTNI D2 ng/L  --  -1  --    HS TNI 4HR ng/L 42  --   --    HSTNI D4 ng/L 0  --   --                Results from last 7 days   Lab Units 12/14/21  1407   NT-PRO BNP pg/mL 3,168*           Results from last 7 days   Lab Units 12/14/21  1744   CLARITY UA  Clear   COLOR UA  Yellow   SPEC GRAV UA  1 010   PH UA  5 0   GLUCOSE UA mg/dl Negative   KETONES UA mg/dl Negative   BLOOD UA  Trace*   PROTEIN UA mg/dl Negative   NITRITE UA  Negative   BILIRUBIN UA  Interference- unable to analyze*   UROBILINOGEN UA E U /dl 4 0* LEUKOCYTES UA  Trace*   WBC UA /hpf 1-2*   RBC UA /hpf 1-2*   BACTERIA UA /hpf Occasional   EPITHELIAL CELLS WET PREP /hpf Occasional                 Results from last 7 days   Lab Units 12/14/21  1407   ACETAMINOPHEN LVL ug/mL <2*                 ED Treatment:   Medication Administration from 12/14/2021 1303 to 12/14/2021 1946       Date/Time Order Dose Route Action     12/14/2021 1529 iohexol (OMNIPAQUE) 350 MG/ML injection (SINGLE-DOSE) 100 mL 100 mL Intravenous Given     12/14/2021 1731 cefepime (MAXIPIME) 2 g/50 mL dextrose IVPB 2,000 mg Intravenous New Bag     12/14/2021 1731 metroNIDAZOLE (FLAGYL) tablet 500 mg 500 mg Oral Given        Past Medical History:   Diagnosis Date    Hypertension     Stroke [facility])      Present on Admission:   Atrial fibrillation ([facility] [address] )   Essential hypertension      Admitting Diagnosis: Diverticulitis [K57 92]  Jaundice [R17]  Liver mass [R16 0]  Age/Sex: 80 y o  female  Admission Orders:  Scheduled Medications:  aspirin, 81 mg, Oral, Daily  atorvastatin, 40 mg, Oral, QPM  cefTRIAXone, 1,000 mg, Intravenous, Q24H  escitalopram, 5 mg, Oral, HS  hydrochlorothiazide, 12 5 mg, Oral, Daily  melatonin, 4 5 mg, Oral, HS  metoprolol tartrate, 25 mg, Oral, BID  metroNIDAZOLE, 500 mg, Oral, Q8H ONEAL  potassium chloride (K-DUR,KLOR-CON) CR tablet 40 mEq  Dose: 40 mEq  Freq: Once Route: PO  Start: 12/14/21 1945 End: 12/14/21 2224  apixaban (ELIQUIS) tablet 2 5 mg  Dose: 2 5 mg  Freq: 2 times daily Route: PO  Start: 12/14/21 2145 End: 12/15/21 1313  Continuous IV Infusions:     PRN Meds:  ondansetron, 4 mg, Intravenous, Q6H PRN    SCD's   CL diet 12/15  OOB    IP CONSULT TO GASTROENTEROLOGY    Network Utilization Review Department  ATTENTION: Please call with any questions or concerns to [phone] and carefully listen to the prompts so that you are directed to the right person   All voicemails are confidential   Karol Rdz all requests for admission clinical reviews, approved or denied determinations and any other requests to dedicated fax number below belonging to the campus where the patient is receiving treatment   List of dedicated fax numbers for the Facilities:  [address] [place] [address] DENIALS (Administrative/Medical Necessity) [phone]   [address] (Maternity/NICU/Pediatrics) [phone]   [address]  [address] [facility] [place] [address] [address] [place] [address] [address] [facility] [place] [address] [phone]

## 2021-12-15 NOTE — UTILIZATION REVIEW
Inpatient Admission Authorization Request   NOTIFICATION OF INPATIENT ADMISSION/INPATIENT AUTHORIZATION REQUEST   SERVICING FACILITY:   [address] [place] [address], [facility], [facility]  Tax ID: [id number]  NPI: [id number]  Place of Service: Inpatient [address] [facility]  [address]  Place of Service Code: 24     ATTENDING PROVIDER:  Attending Name and NPI#: Lizeth Dubois [[id number]]  Address: [address] [place] [address], [facility], [facility]  Phone: [phone]     UTILIZATION REVIEW CONTACT:  Nery Brenner, Utilization   Network Utilization Review Department  Phone: [phone]  Fax: [phone]  Email: [email or web address]     PHYSICIAN ADVISORY SERVICES:  FOR [id number] REVIEW - MEDICAL NECESSITY DENIAL  Phone: [phone]  Fax: [phone]  Email: [email or web address]     TYPE OF REQUEST:  Inpatient Status     ADMISSION INFORMATION:  ADMISSION DATE/TIME: 12/14/21  5:26 PM  PATIENT DIAGNOSIS CODE/DESCRIPTION:  Diverticulitis [K57 92]  Jaundice [R17]  Liver mass [R16 0]  DISCHARGE DATE/TIME: No discharge date for patient encounter  DISCHARGE DISPOSITION (IF DISCHARGED): Final discharge disposition not confirmed     IMPORTANT INFORMATION:  Please contact the Nery Brenner directly with any questions or concerns regarding this request  Department voicemails are confidential     Send requests for admission clinical reviews, concurrent reviews, approvals, and administrative denials due to lack of clinical to fax [phone]

## 2021-12-15 NOTE — H&P (VIEW-ONLY)
Consultation - [facility]  Rachelle Almanzar 80 y o  female MRN: [id number]  Unit/Bed#: E5 -01 Encounter: [id number]        Inpatient consult to gastroenterology  Consult performed by: eGorge Downing PA-C  Consult ordered by: Erica Salguero MD      Reason for Consult / Principal Problem:     Jaundice  Liver mass      ASSESSMENT AND PLAN:      31-year-old female with a past medical history of hypertension, AFib, CVA, on Eliquis, who presents with painless jaundice  1  Jaundice  2  Liver lesion  3  Transaminitis   The patient presents with jaundice, lesions throughout her liver with ductal dilation, and elevated liver enzymes (, , alk phos 623, and t bili 4 81)    - attending to review CT scan with radiology and family, next steps (possibly MRCP or ERCP) to be determined after that, if we proceed with ERCP Eliquis will need to be help for 2 full days prior     4  Abnormal CT scan  5  Diverticulitis  CT also revealed an intraluminal curvilinear radiopaque foreign body at the rectosigmoid junction of uncertain etiology, and diverticulitis without mass, perforation, or abscess  - continue antibiotics   - further workup pending discussion as above     ______________________________________________________________________    HPI:  31-year-old female with a past medical history of hypertension, AFib, CVA, on Eliquis, who presents with jaundice  The family noticed this over the past few days  She does admit to some generalized abdominal tenderness   CT abdomen and pelvis with contrast obtained and revealing lobulated low-attenuation soft tissue mass centrally within the liver with additional smaller satellite lesions without primary tumor site identified, intrahepatic biliary ductal dilatation mostly in the left lateral segment of the liver, an intraluminal curvilinear radiopaque foreign body at the rectosigmoid junction of uncertain etiology, and diverticulitis without mass, perforation, or abscess  LFTs are elevated with an , , alk phos 623, and t bili 4 81  REVIEW OF SYSTEMS:    CONSTITUTIONAL: Denies any fever, chills, rigors, and weight loss  HEENT: No earache or tinnitus  Denies hearing loss or visual disturbances  CARDIOVASCULAR: No chest pain or palpitations  RESPIRATORY: Denies any cough, hemoptysis, shortness of breath or dyspnea on exertion  GASTROINTESTINAL: As noted in the History of Present Illness  GENITOURINARY: No problems with urination  Denies any hematuria or dysuria  NEUROLOGIC: No dizziness or vertigo, denies headaches  MUSCULOSKELETAL: Denies any muscle or joint pain  SKIN: Denies skin rashes or itching  ENDOCRINE: Denies excessive thirst  Denies intolerance to heat or cold  PSYCHOSOCIAL: Denies depression or anxiety  Denies any recent memory loss  Historical Information   Past Medical History:   Diagnosis Date    Hypertension     Stroke [facility])      Past Surgical History:   Procedure Laterality Date    CARDIAC PACEMAKER PLACEMENT       Social History   Social History     Substance and Sexual Activity   Alcohol Use Never     Social History     Substance and Sexual Activity   Drug Use Never     Social History     Tobacco Use   Smoking Status Never Smoker   Smokeless Tobacco Never Used     History reviewed  No pertinent family history      Meds/Allergies     Medications Prior to Admission   Medication    apixaban (Eliquis) 2 5 mg    aspirin (ECOTRIN LOW STRENGTH) 81 mg EC tablet    atorvastatin (LIPITOR) 10 mg tablet    escitalopram (LEXAPRO) 5 mg tablet    hydrochlorothiazide (HYDRODIURIL) 12 5 mg tablet    Melatonin 5 MG TABS    metoprolol tartrate (LOPRESSOR) 25 mg tablet    Omega-3 1000 MG CAPS    psyllium (METAMUCIL) 0 52 g capsule     Current Facility-Administered Medications   Medication Dose Route Frequency    apixaban (ELIQUIS) tablet 2 5 mg  2 5 mg Oral BID    aspirin (ECOTRIN LOW STRENGTH) EC tablet 81 mg 81 mg Oral Daily    atorvastatin (LIPITOR) tablet 40 mg  40 mg Oral QPM    cefTRIAXone (ROCEPHIN) 1,000 mg in dextrose 5 % 50 mL IVPB  1,000 mg Intravenous Q24H    escitalopram (LEXAPRO) tablet 5 mg  5 mg Oral HS    hydrochlorothiazide (HYDRODIURIL) tablet 12 5 mg  12 5 mg Oral Daily    melatonin tablet 4 5 mg  4 5 mg Oral HS    metoprolol tartrate (LOPRESSOR) tablet 25 mg  25 mg Oral BID    metroNIDAZOLE (FLAGYL) tablet 500 mg  500 mg Oral Q8H [facility]    ondansetron (ZOFRAN) injection 4 mg  4 mg Intravenous Q6H PRN       Allergies   Allergen Reactions    Penicillins Hives           Objective     Blood pressure 128/60, pulse 95, temperature 97 8 °F (36 6 °C), resp  rate 18, SpO2 96 %  There is no height or weight on file to calculate BMI  Intake/Output Summary (Last 24 hours) at 12/15/2021 0724  Last data filed at 12/14/2021 1927  Gross per 24 hour   Intake 50 ml   Output --   Net 50 ml         PHYSICAL EXAM:      General Appearance:   Alert, cooperative, no distress   HEENT:   Normocephalic, atraumatic, anicteric      Neck:  Supple, symmetrical, trachea midline   Lungs:   Clear to auscultation bilaterally; no rales, rhonchi or wheezing; respirations unlabored    Heart[de-identified]   Regular rate and rhythm; no murmur, rub, or gallop     Abdomen:   Soft, non-tender, non-distended; normal bowel sounds; no masses, no organomegaly    Genitalia:   Deferred    Rectal:   Deferred    Extremities:  No cyanosis, clubbing or edema    Skin:  + jaundice, no rashes or lesions          Lab Results:   Admission on 12/14/2021   Component Date Value    WBC 12/14/2021 12 12*    RBC 12/14/2021 4 29     Hemoglobin 12/14/2021 12 2     Hematocrit 12/14/2021 38 1     MCV 12/14/2021 89     MCH 12/14/2021 28 4     MCHC 12/14/2021 32 0     RDW 12/14/2021 16 2*    MPV 12/14/2021 11 4     Platelets 47/84/4615 352     nRBC 12/14/2021 0     Neutrophils Relative 12/14/2021 74     Immat GRANS % 12/14/2021 2     Lymphocytes Relative 12/14/2021 13*    Monocytes Relative 12/14/2021 8     Eosinophils Relative 12/14/2021 2     Basophils Relative 12/14/2021 1     Neutrophils Absolute 12/14/2021 9 06*    Immature Grans Absolute 12/14/2021 0 18     Lymphocytes Absolute 12/14/2021 1 62     Monocytes Absolute 12/14/2021 0 94     Eosinophils Absolute 12/14/2021 0 26     Basophils Absolute 12/14/2021 0 06     Sodium 12/14/2021 138     Potassium 12/14/2021 3 0*    Chloride 12/14/2021 97*    CO2 12/14/2021 30     ANION GAP 12/14/2021 11     BUN 12/14/2021 44*    Creatinine 12/14/2021 0 69     Glucose 12/14/2021 117     Calcium 12/14/2021 10 1     eGFR 12/14/2021 77     Total Bilirubin 12/14/2021 6 33*    Bilirubin, Direct 12/14/2021 5 03*    Alkaline Phosphatase 12/14/2021 771*    AST 12/14/2021 155*    ALT 12/14/2021 187*    Total Protein 12/14/2021 8 4*    Albumin 12/14/2021 2 6*    Acetaminophen Level 12/14/2021 <2*    NT-proBNP 12/14/2021 3,168*    hs TnI 0hr 12/14/2021 42     hs TnI 2hr 12/14/2021 41     Delta 2hr hsTnI 12/14/2021 -1     hs TnI 4hr 12/15/2021 42     Delta 4hr hsTnI 12/15/2021 0     Ventricular Rate 12/14/2021 80     Atrial Rate 12/14/2021 214     QRSD Interval 12/14/2021 90     QT Interval 12/14/2021 350     QTC Interval 12/14/2021 403     QRS Axis 12/14/2021 57     T Wave Axis 12/14/2021 104     Ventricular Rate 12/14/2021 82     Atrial Rate 12/14/2021 159     QRSD Interval 12/14/2021 88     QT Interval 12/14/2021 350     QTC Interval 12/14/2021 408     QRS Axis 12/14/2021 57     T Wave Axis 12/14/2021 106     Color, UA 12/14/2021 Yellow     Clarity, UA 12/14/2021 Clear     pH, UA 12/14/2021 5 0     Leukocytes, UA 12/14/2021 Trace*    Nitrite, UA 12/14/2021 Negative     Protein, UA 12/14/2021 Negative     Glucose, UA 12/14/2021 Negative     Ketones, UA 12/14/2021 Negative     Urobilinogen, UA 12/14/2021 4 0*    Bilirubin, UA 12/14/2021 Interference- unable to analyze*    Blood, UA 12/14/2021 Trace*    Specific Davey, UA 12/14/2021 1 010     RBC, UA 12/14/2021 1-2*    WBC, UA 12/14/2021 1-2*    Epithelial Cells 12/14/2021 Occasional     Bacteria, UA 12/14/2021 Occasional     Sodium 12/15/2021 140     Potassium 12/15/2021 3 7     Chloride 12/15/2021 102     CO2 12/15/2021 27     ANION GAP 12/15/2021 11     BUN 12/15/2021 39*    Creatinine 12/15/2021 0 69     Glucose 12/15/2021 96     Calcium 12/15/2021 9 6     Corrected Calcium 12/15/2021 11 1*    AST 12/15/2021 106*    ALT 12/15/2021 131*    Alkaline Phosphatase 12/15/2021 623*    Total Protein 12/15/2021 6 9     Albumin 12/15/2021 2 1*    Total Bilirubin 12/15/2021 4 81*    eGFR 12/15/2021 77     WBC 12/15/2021 10 58*    RBC 12/15/2021 3 82     Hemoglobin 12/15/2021 10 5*    Hematocrit 12/15/2021 32 5*    MCV 12/15/2021 85     MCH 12/15/2021 27 5     MCHC 12/15/2021 32 3     RDW 12/15/2021 16 5*    MPV 12/15/2021 10 6     Platelets 19/96/5939 329     nRBC 12/15/2021 0     Neutrophils Relative 12/15/2021 75     Immat GRANS % 12/15/2021 1     Lymphocytes Relative 12/15/2021 14     Monocytes Relative 12/15/2021 8     Eosinophils Relative 12/15/2021 2     Basophils Relative 12/15/2021 0     Neutrophils Absolute 12/15/2021 7 87*    Immature Grans Absolute 12/15/2021 0 12     Lymphocytes Absolute 12/15/2021 1 50     Monocytes Absolute 12/15/2021 0 87     Eosinophils Absolute 12/15/2021 0 18     Basophils Absolute 12/15/2021 0 04        Imaging Studies: I have personally reviewed pertinent imaging studies

## 2021-12-15 NOTE — PLAN OF CARE
Problem: Potential for Falls  Goal: Patient will remain free of falls  Description: INTERVENTIONS:  - Educate patient/family on patient safety including physical limitations  - Instruct patient to call for assistance with activity   - Consult OT/PT to assist with strengthening/mobility   - Keep Call bell within reach  - Keep bed low and locked with side rails adjusted as appropriate  - Keep care items and personal belongings within reach  - Initiate and maintain comfort rounds  - Make Fall Risk Sign visible to staff  - Offer Toileting every 2  Hours, in advance of need  - Initiate/Maintain  bed alarm  - Obtain necessary fall risk management equipment:   - Apply yellow socks and bracelet for high fall risk patients  - Consider moving patient to room near nurses station  Outcome: Progressing

## 2021-12-15 NOTE — ASSESSMENT & PLAN NOTE
Relatively painless jaundice    Concerning for either liver cancer or cholangiocarcinoma    Waiting for GI opinion on how to proceed with either MRCP, ERCP or other biopsy    She is comfortable

## 2021-12-15 NOTE — CONSULTS
Consultation - [facility]  Hao Marrufo 80 y o  female MRN: [id number]  Unit/Bed#: E5 -01 Encounter: [id number]        Inpatient consult to gastroenterology  Consult performed by: Sharlee Dakins, PA-C  Consult ordered by: Yoselin Moncada MD      Reason for Consult / Principal Problem:     Jaundice  Liver mass      ASSESSMENT AND PLAN:      80-year-old female with a past medical history of hypertension, AFib, CVA, on Eliquis, who presents with painless jaundice  1  Jaundice  2  Liver lesion  3  Transaminitis   The patient presents with jaundice, lesions throughout her liver with ductal dilation, and elevated liver enzymes (, , alk phos 623, and t bili 4 81)    - attending to review CT scan with radiology and family, next steps (possibly MRCP or ERCP) to be determined after that, if we proceed with ERCP Eliquis will need to be help for 2 full days prior     4  Abnormal CT scan  5  Diverticulitis  CT also revealed an intraluminal curvilinear radiopaque foreign body at the rectosigmoid junction of uncertain etiology, and diverticulitis without mass, perforation, or abscess  - continue antibiotics   - further workup pending discussion as above     ______________________________________________________________________    HPI:  80-year-old female with a past medical history of hypertension, AFib, CVA, on Eliquis, who presents with jaundice  The family noticed this over the past few days  She does admit to some generalized abdominal tenderness   CT abdomen and pelvis with contrast obtained and revealing lobulated low-attenuation soft tissue mass centrally within the liver with additional smaller satellite lesions without primary tumor site identified, intrahepatic biliary ductal dilatation mostly in the left lateral segment of the liver, an intraluminal curvilinear radiopaque foreign body at the rectosigmoid junction of uncertain etiology, and diverticulitis without mass, perforation, or abscess  LFTs are elevated with an , , alk phos 623, and t bili 4 81  REVIEW OF SYSTEMS:    CONSTITUTIONAL: Denies any fever, chills, rigors, and weight loss  HEENT: No earache or tinnitus  Denies hearing loss or visual disturbances  CARDIOVASCULAR: No chest pain or palpitations  RESPIRATORY: Denies any cough, hemoptysis, shortness of breath or dyspnea on exertion  GASTROINTESTINAL: As noted in the History of Present Illness  GENITOURINARY: No problems with urination  Denies any hematuria or dysuria  NEUROLOGIC: No dizziness or vertigo, denies headaches  MUSCULOSKELETAL: Denies any muscle or joint pain  SKIN: Denies skin rashes or itching  ENDOCRINE: Denies excessive thirst  Denies intolerance to heat or cold  PSYCHOSOCIAL: Denies depression or anxiety  Denies any recent memory loss  Historical Information   Past Medical History:   Diagnosis Date    Hypertension     Stroke [facility])      Past Surgical History:   Procedure Laterality Date    CARDIAC PACEMAKER PLACEMENT       Social History   Social History     Substance and Sexual Activity   Alcohol Use Never     Social History     Substance and Sexual Activity   Drug Use Never     Social History     Tobacco Use   Smoking Status Never Smoker   Smokeless Tobacco Never Used     History reviewed  No pertinent family history      Meds/Allergies     Medications Prior to Admission   Medication    apixaban (Eliquis) 2 5 mg    aspirin (ECOTRIN LOW STRENGTH) 81 mg EC tablet    atorvastatin (LIPITOR) 10 mg tablet    escitalopram (LEXAPRO) 5 mg tablet    hydrochlorothiazide (HYDRODIURIL) 12 5 mg tablet    Melatonin 5 MG TABS    metoprolol tartrate (LOPRESSOR) 25 mg tablet    Omega-3 1000 MG CAPS    psyllium (METAMUCIL) 0 52 g capsule     Current Facility-Administered Medications   Medication Dose Route Frequency    apixaban (ELIQUIS) tablet 2 5 mg  2 5 mg Oral BID    aspirin (ECOTRIN LOW STRENGTH) EC tablet 81 mg 81 mg Oral Daily    atorvastatin (LIPITOR) tablet 40 mg  40 mg Oral QPM    cefTRIAXone (ROCEPHIN) 1,000 mg in dextrose 5 % 50 mL IVPB  1,000 mg Intravenous Q24H    escitalopram (LEXAPRO) tablet 5 mg  5 mg Oral HS    hydrochlorothiazide (HYDRODIURIL) tablet 12 5 mg  12 5 mg Oral Daily    melatonin tablet 4 5 mg  4 5 mg Oral HS    metoprolol tartrate (LOPRESSOR) tablet 25 mg  25 mg Oral BID    metroNIDAZOLE (FLAGYL) tablet 500 mg  500 mg Oral Q8H [address]    ondansetron (ZOFRAN) injection 4 mg  4 mg Intravenous Q6H PRN       Allergies   Allergen Reactions    Penicillins Hives           Objective     Blood pressure 128/60, pulse 95, temperature 97 8 °F (36 6 °C), resp  rate 18, SpO2 96 %  There is no height or weight on file to calculate BMI  Intake/Output Summary (Last 24 hours) at 12/15/2021 0724  Last data filed at 12/14/2021 1927  Gross per 24 hour   Intake 50 ml   Output --   Net 50 ml         PHYSICAL EXAM:      General Appearance:   Alert, cooperative, no distress   HEENT:   Normocephalic, atraumatic, anicteric      Neck:  Supple, symmetrical, trachea midline   Lungs:   Clear to auscultation bilaterally; no rales, rhonchi or wheezing; respirations unlabored    Heart[de-identified]   Regular rate and rhythm; no murmur, rub, or gallop     Abdomen:   Soft, non-tender, non-distended; normal bowel sounds; no masses, no organomegaly    Genitalia:   Deferred    Rectal:   Deferred    Extremities:  No cyanosis, clubbing or edema    Skin:  + jaundice, no rashes or lesions          Lab Results:   Admission on 12/14/2021   Component Date Value    WBC 12/14/2021 12 12*    RBC 12/14/2021 4 29     Hemoglobin 12/14/2021 12 2     Hematocrit 12/14/2021 38 1     MCV 12/14/2021 89     MCH 12/14/2021 28 4     MCHC 12/14/2021 32 0     RDW 12/14/2021 16 2*    MPV 12/14/2021 11 4     Platelets 02/44/7128 352     nRBC 12/14/2021 0     Neutrophils Relative 12/14/2021 74     Immat GRANS % 12/14/2021 2     Lymphocytes Relative 12/14/2021 13*    Monocytes Relative 12/14/2021 8     Eosinophils Relative 12/14/2021 2     Basophils Relative 12/14/2021 1     Neutrophils Absolute 12/14/2021 9 06*    Immature Grans Absolute 12/14/2021 0 18     Lymphocytes Absolute 12/14/2021 1 62     Monocytes Absolute 12/14/2021 0 94     Eosinophils Absolute 12/14/2021 0 26     Basophils Absolute 12/14/2021 0 06     Sodium 12/14/2021 138     Potassium 12/14/2021 3 0*    Chloride 12/14/2021 97*    CO2 12/14/2021 30     ANION GAP 12/14/2021 11     BUN 12/14/2021 44*    Creatinine 12/14/2021 0 69     Glucose 12/14/2021 117     Calcium 12/14/2021 10 1     eGFR 12/14/2021 77     Total Bilirubin 12/14/2021 6 33*    Bilirubin, Direct 12/14/2021 5 03*    Alkaline Phosphatase 12/14/2021 771*    AST 12/14/2021 155*    ALT 12/14/2021 187*    Total Protein 12/14/2021 8 4*    Albumin 12/14/2021 2 6*    Acetaminophen Level 12/14/2021 <2*    NT-proBNP 12/14/2021 3,168*    hs TnI 0hr 12/14/2021 42     hs TnI 2hr 12/14/2021 41     Delta 2hr hsTnI 12/14/2021 -1     hs TnI 4hr 12/15/2021 42     Delta 4hr hsTnI 12/15/2021 0     Ventricular Rate 12/14/2021 80     Atrial Rate 12/14/2021 214     QRSD Interval 12/14/2021 90     QT Interval 12/14/2021 350     QTC Interval 12/14/2021 403     QRS Axis 12/14/2021 57     T Wave Axis 12/14/2021 104     Ventricular Rate 12/14/2021 82     Atrial Rate 12/14/2021 159     QRSD Interval 12/14/2021 88     QT Interval 12/14/2021 350     QTC Interval 12/14/2021 408     QRS Axis 12/14/2021 57     T Wave Axis 12/14/2021 106     Color, UA 12/14/2021 Yellow     Clarity, UA 12/14/2021 Clear     pH, UA 12/14/2021 5 0     Leukocytes, UA 12/14/2021 Trace*    Nitrite, UA 12/14/2021 Negative     Protein, UA 12/14/2021 Negative     Glucose, UA 12/14/2021 Negative     Ketones, UA 12/14/2021 Negative     Urobilinogen, UA 12/14/2021 4 0*    Bilirubin, UA 12/14/2021 Interference- unable to analyze*    Blood, UA 12/14/2021 Trace*    Specific [place], UA 12/14/2021 1 010     RBC, UA 12/14/2021 1-2*    WBC, UA 12/14/2021 1-2*    Epithelial Cells 12/14/2021 Occasional     Bacteria, UA 12/14/2021 Occasional     Sodium 12/15/2021 140     Potassium 12/15/2021 3 7     Chloride 12/15/2021 102     CO2 12/15/2021 27     ANION GAP 12/15/2021 11     BUN 12/15/2021 39*    Creatinine 12/15/2021 0 69     Glucose 12/15/2021 96     Calcium 12/15/2021 9 6     Corrected Calcium 12/15/2021 11 1*    AST 12/15/2021 106*    ALT 12/15/2021 131*    Alkaline Phosphatase 12/15/2021 623*    Total Protein 12/15/2021 6 9     Albumin 12/15/2021 2 1*    Total Bilirubin 12/15/2021 4 81*    eGFR 12/15/2021 77     WBC 12/15/2021 10 58*    RBC 12/15/2021 3 82     Hemoglobin 12/15/2021 10 5*    Hematocrit 12/15/2021 32 5*    MCV 12/15/2021 85     MCH 12/15/2021 27 5     MCHC 12/15/2021 32 3     RDW 12/15/2021 16 5*    MPV 12/15/2021 10 6     Platelets 50/24/6621 329     nRBC 12/15/2021 0     Neutrophils Relative 12/15/2021 75     Immat GRANS % 12/15/2021 1     Lymphocytes Relative 12/15/2021 14     Monocytes Relative 12/15/2021 8     Eosinophils Relative 12/15/2021 2     Basophils Relative 12/15/2021 0     Neutrophils Absolute 12/15/2021 7 87*    Immature Grans Absolute 12/15/2021 0 12     Lymphocytes Absolute 12/15/2021 1 50     Monocytes Absolute 12/15/2021 0 87     Eosinophils Absolute 12/15/2021 0 18     Basophils Absolute 12/15/2021 0 04        Imaging Studies: I have personally reviewed pertinent imaging studies

## 2021-12-15 NOTE — PROGRESS NOTES
[address] [facility]  Progress Note Maura Rosas 1933, 80 y o  female MRN: [id number]  Unit/Bed#: E5 -01 Encounter: [id number]  Primary Care Provider: Lupe Zelaya DO   Date and time admitted to hospital: 2021  1:10 PM    * Jaundice  Assessment & Plan  Relatively painless jaundice  Concerning for either liver cancer or cholangiocarcinoma    Waiting for GI opinion on how to proceed with either MRCP, ERCP or other biopsy    She is comfortable    Atrial fibrillation ([address] )  Assessment & Plan  · Maintained on metoprolol for rate control      Would hold anticoagulation for upcoming procedures          Subjective:   Complains of mild pain all over her abdomen  But it is tolerable    No other complaints  Objective:     Vitals:   Temp (24hrs), Av 8 °F (36 6 °C), Min:97 7 °F (36 5 °C), Max:97 9 °F (36 6 °C)    Temp:  [97 7 °F (36 5 °C)-97 9 °F (36 6 °C)] 97 8 °F (36 6 °C)  HR:  [] 99  Resp:  [16-18] 18  BP: (124-161)/(56-74) 131/62  SpO2:  [93 %-96 %] 95 %  There is no height or weight on file to calculate BMI  Input and Output Summary (last 24 hours): Intake/Output Summary (Last 24 hours) at 12/15/2021 1310  Last data filed at 2021 1927  Gross per 24 hour   Intake 50 ml   Output --   Net 50 ml       Physical Exam:     Physical Exam  Vitals and nursing note reviewed  HENT:      Head: Normocephalic and atraumatic  Eyes:      Pupils: Pupils are equal, round, and reactive to light  Cardiovascular:      Rate and Rhythm: Normal rate and regular rhythm  Heart sounds: No murmur heard  No friction rub  No gallop  Pulmonary:      Effort: Pulmonary effort is normal       Breath sounds: Normal breath sounds  No wheezing or rales  Abdominal:      General: Bowel sounds are normal       Palpations: Abdomen is soft  Tenderness: There is abdominal tenderness (Mild tenderness to palpation over the liver  No Lenz sign  )     Musculoskeletal: Right lower leg: No edema  Left lower leg: No edema  Skin:     Coloration: Skin is jaundiced          Additional Data:     Labs:    Results from last 7 days   Lab Units 12/15/21  0448   WBC Thousand/uL 10 58*   HEMOGLOBIN g/dL 10 5*   HEMATOCRIT % 32 5*   PLATELETS Thousands/uL 329   NEUTROS PCT % 75   LYMPHS PCT % 14   MONOS PCT % 8   EOS PCT % 2     Results from last 7 days   Lab Units 12/15/21  0448   POTASSIUM mmol/L 3 7   CHLORIDE mmol/L 102   CO2 mmol/L 27   BUN mg/dL 39*   CREATININE mg/dL 0 69   CALCIUM mg/dL 9 6   ALK PHOS U/L 623*   ALT U/L 131*   AST U/L 106*                       * I Have Reviewed All Lab Data     Recent Cultures (last 7 days):             Last 24 Hours Medication List:   Current Facility-Administered Medications   Medication Dose Route Frequency Provider Last Rate    apixaban  2 5 mg Oral BID Tanja Steel MD      aspirin  81 mg Oral Daily Tanja Steel MD      atorvastatin  40 mg Oral QPM Tanja Steel MD      cefTRIAXone  1,000 mg Intravenous Q24H Tanja Steel MD 1,000 mg (12/15/21 0057)    escitalopram  5 mg Oral HS Tanja Steel MD      hydrochlorothiazide  12 5 mg Oral Daily Tanja Steel MD      melatonin  4 5 mg Oral HS Tanja Steel MD      metoprolol tartrate  25 mg Oral BID Tanja Steel MD      metroNIDAZOLE  500 mg Oral Q8H [address] Tanja Steel MD      ondansetron  4 mg Intravenous Q6H PRN Tanja Steel MD           VTE Pharmacologic Prophylaxis:   Pharmacologic: Other Medication: hold with potential procedure      Current Length of Stay: 1 day(s)    Current Patient Status: Inpatient       Discharge Plan:   Code Status: Level 1 - Full Code           Today, Patient Was Seen By: Levar Bhatt DO    ** Please Note: Dictation voice to text software may have been used in the creation of this document   **

## 2021-12-15 NOTE — ASSESSMENT & PLAN NOTE
· Recent right MCA infarct at [facility] 11/2021  · No residual deficits  · Maintained on aspirin, Eliquis, statin

## 2021-12-15 NOTE — PLAN OF CARE
Problem: Potential for Falls  Goal: Patient will remain free of falls  Description: INTERVENTIONS:  - Educate patient/family on patient safety including physical limitations  - Instruct patient to call for assistance with activity   - Consult OT/PT to assist with strengthening/mobility   - Keep Call bell within reach  - Keep bed low and locked with side rails adjusted as appropriate  - Keep care items and personal belongings within reach  - Initiate and maintain comfort rounds  - Make Fall Risk Sign visible to staff  - Offer Toileting every  Hours, in advance of need  - Initiate/Maintain alarm  - Obtain necessary fall risk management equipment:   - Apply yellow socks and bracelet for high fall risk patients  - Consider moving patient to room near nurses station  Outcome: Progressing     Problem: MOBILITY - ADULT  Goal: Maintain or return to baseline ADL function  Description: INTERVENTIONS:  -  Assess patient's ability to carry out ADLs; assess patient's baseline for ADL function and identify physical deficits which impact ability to perform ADLs (bathing, care of mouth/teeth, toileting, grooming, dressing, etc )  - Assess/evaluate cause of self-care deficits   - Assess range of motion  - Assess patient's mobility; develop plan if impaired  - Assess patient's need for assistive devices and provide as appropriate  - Encourage maximum independence but intervene and supervise when necessary  - Involve family in performance of ADLs  - Assess for home care needs following discharge   - Consider OT consult to assist with ADL evaluation and planning for discharge  - Provide patient education as appropriate  Outcome: Progressing  Goal: Maintains/Returns to pre admission functional level  Description: INTERVENTIONS:  - Perform BMAT or MOVE assessment daily    - Set and communicate daily mobility goal to care team and patient/family/caregiver     - Collaborate with rehabilitation services on mobility goals if consulted  - Perform Range of Motion  times a day  - Reposition patient every  hours    - Dangle patient  times a day  - Stand patient  times a day  - Ambulate patient  times a day  - Out of bed to chair  times a day   - Out of bed for meals  times a day  - Out of bed for toileting  - Record patient progress and toleration of activity level   Outcome: Progressing     Problem: Prexisting or High Potential for Compromised Skin Integrity  Goal: Skin integrity is maintained or improved  Description: INTERVENTIONS:  - Identify patients at risk for skin breakdown  - Assess and monitor skin integrity  - Assess and monitor nutrition and hydration status  - Monitor labs   - Assess for incontinence   - Turn and reposition patient  - Assist with mobility/ambulation  - Relieve pressure over bony prominences  - Avoid friction and shearing  - Provide appropriate hygiene as needed including keeping skin clean and dry  - Evaluate need for skin moisturizer/barrier cream  - Collaborate with interdisciplinary team   - Patient/family teaching  - Consider wound care consult   Outcome: Progressing

## 2021-12-16 LAB
ALBUMIN SERPL BCP-MCNC: 2 G/DL (ref 3.5–5)
ALP SERPL-CCNC: 664 U/L (ref 46–116)
ALT SERPL W P-5'-P-CCNC: 111 U/L (ref 12–78)
ANION GAP SERPL CALCULATED.3IONS-SCNC: 10 MMOL/L (ref 4–13)
AST SERPL W P-5'-P-CCNC: 99 U/L (ref 5–45)
BASOPHILS # BLD AUTO: 0.03 THOUSANDS/ΜL (ref 0–0.1)
BASOPHILS NFR BLD AUTO: 0 % (ref 0–1)
BILIRUB DIRECT SERPL-MCNC: 3.32 MG/DL (ref 0–0.2)
BILIRUB SERPL-MCNC: 4.4 MG/DL (ref 0.2–1)
BUN SERPL-MCNC: 38 MG/DL (ref 5–25)
CALCIUM SERPL-MCNC: 9.2 MG/DL (ref 8.3–10.1)
CHLORIDE SERPL-SCNC: 101 MMOL/L (ref 100–108)
CO2 SERPL-SCNC: 28 MMOL/L (ref 21–32)
CREAT SERPL-MCNC: 0.77 MG/DL (ref 0.6–1.3)
EOSINOPHIL # BLD AUTO: 0.3 THOUSAND/ΜL (ref 0–0.61)
EOSINOPHIL NFR BLD AUTO: 3 % (ref 0–6)
ERYTHROCYTE [DISTWIDTH] IN BLOOD BY AUTOMATED COUNT: 16.4 % (ref 11.6–15.1)
GFR SERPL CREATININE-BSD FRML MDRD: 69 ML/MIN/1.73SQ M
GLUCOSE SERPL-MCNC: 116 MG/DL (ref 65–140)
HCT VFR BLD AUTO: 33.1 % (ref 34.8–46.1)
HGB BLD-MCNC: 10.7 G/DL (ref 11.5–15.4)
IMM GRANULOCYTES # BLD AUTO: 0.16 THOUSAND/UL (ref 0–0.2)
IMM GRANULOCYTES NFR BLD AUTO: 2 % (ref 0–2)
LYMPHOCYTES # BLD AUTO: 1.52 THOUSANDS/ΜL (ref 0.6–4.47)
LYMPHOCYTES NFR BLD AUTO: 15 % (ref 14–44)
MAGNESIUM SERPL-MCNC: 2.3 MG/DL (ref 1.6–2.6)
MCH RBC QN AUTO: 28.4 PG (ref 26.8–34.3)
MCHC RBC AUTO-ENTMCNC: 32.3 G/DL (ref 31.4–37.4)
MCV RBC AUTO: 88 FL (ref 82–98)
MONOCYTES # BLD AUTO: 0.81 THOUSAND/ΜL (ref 0.17–1.22)
MONOCYTES NFR BLD AUTO: 8 % (ref 4–12)
NEUTROPHILS # BLD AUTO: 7.44 THOUSANDS/ΜL (ref 1.85–7.62)
NEUTS SEG NFR BLD AUTO: 72 % (ref 43–75)
NRBC BLD AUTO-RTO: 0 /100 WBCS
PLATELET # BLD AUTO: 324 THOUSANDS/UL (ref 149–390)
PMV BLD AUTO: 10.6 FL (ref 8.9–12.7)
POTASSIUM SERPL-SCNC: 3.6 MMOL/L (ref 3.5–5.3)
PROT SERPL-MCNC: 6.7 G/DL (ref 6.4–8.2)
RBC # BLD AUTO: 3.77 MILLION/UL (ref 3.81–5.12)
SODIUM SERPL-SCNC: 139 MMOL/L (ref 136–145)
WBC # BLD AUTO: 10.26 THOUSAND/UL (ref 4.31–10.16)

## 2021-12-16 PROCEDURE — 99232 SBSQ HOSP IP/OBS MODERATE 35: CPT | Performed by: HOSPITALIST

## 2021-12-16 PROCEDURE — 80048 BASIC METABOLIC PNL TOTAL CA: CPT | Performed by: HOSPITALIST

## 2021-12-16 PROCEDURE — 85025 COMPLETE CBC W/AUTO DIFF WBC: CPT | Performed by: HOSPITALIST

## 2021-12-16 PROCEDURE — 83735 ASSAY OF MAGNESIUM: CPT | Performed by: HOSPITALIST

## 2021-12-16 PROCEDURE — 80076 HEPATIC FUNCTION PANEL: CPT | Performed by: PHYSICIAN ASSISTANT

## 2021-12-16 RX ADMIN — METRONIDAZOLE 500 MG: 500 TABLET ORAL at 22:26

## 2021-12-16 RX ADMIN — HYDROCHLOROTHIAZIDE 12.5 MG: 12.5 TABLET ORAL at 09:13

## 2021-12-16 RX ADMIN — MELATONIN 4.5 MG: at 22:26

## 2021-12-16 RX ADMIN — METRONIDAZOLE 500 MG: 500 TABLET ORAL at 13:22

## 2021-12-16 RX ADMIN — METRONIDAZOLE 500 MG: 500 TABLET ORAL at 05:32

## 2021-12-16 RX ADMIN — CEFTRIAXONE SODIUM 1000 MG: 10 INJECTION, POWDER, FOR SOLUTION INTRAVENOUS at 00:30

## 2021-12-16 RX ADMIN — METOPROLOL TARTRATE 25 MG: 25 TABLET, FILM COATED ORAL at 09:13

## 2021-12-16 RX ADMIN — ESCITALOPRAM OXALATE 5 MG: 10 TABLET ORAL at 22:26

## 2021-12-16 RX ADMIN — ATORVASTATIN CALCIUM 40 MG: 40 TABLET, FILM COATED ORAL at 17:09

## 2021-12-16 RX ADMIN — METOPROLOL TARTRATE 25 MG: 25 TABLET, FILM COATED ORAL at 17:09

## 2021-12-16 RX ADMIN — ASPIRIN 81 MG: 81 TABLET, COATED ORAL at 09:13

## 2021-12-16 NOTE — PROGRESS NOTES
[address] [place] [address]  Progress Note Geovany Vale 1933, 80 y o  female MRN: [id number]  Unit/Bed#: E5 -01 Encounter: [id number]  Primary Care Provider: Mason Salas DO   Date and time admitted to hospital: 2021  1:10 PM    * Jaundice  Assessment & Plan  Relatively painless jaundice  Concerning for either liver cancer or cholangiocarcinoma    Appreciate GI help    MRCP pending    She actually has no pain right now    Atrial fibrillation ([address] )  Assessment & Plan  · Maintained on metoprolol for rate control      Would hold anticoagulation for upcoming procedures            Subjective:   Actually has no pain today  Objective:     Vitals:   Temp (24hrs), Av 9 °F (36 6 °C), Min:97 6 °F (36 4 °C), Max:98 3 °F (36 8 °C)    Temp:  [97 6 °F (36 4 °C)-98 3 °F (36 8 °C)] 97 9 °F (36 6 °C)  HR:  [75-91] 75  Resp:  [17-19] 17  BP: (123-141)/(61-67) 133/62  SpO2:  [94 %-97 %] 94 %  There is no height or weight on file to calculate BMI  Input and Output Summary (last 24 hours):     No intake or output data in the 24 hours ending 21    Physical Exam:     Physical Exam  Vitals and nursing note reviewed  HENT:      Head: Normocephalic and atraumatic  Eyes:      Pupils: Pupils are equal, round, and reactive to light  Cardiovascular:      Rate and Rhythm: Normal rate and regular rhythm  Heart sounds: No murmur heard  No friction rub  No gallop  Pulmonary:      Effort: Pulmonary effort is normal       Breath sounds: Normal breath sounds  No wheezing or rales  Abdominal:      General: Bowel sounds are normal       Palpations: Abdomen is soft  Tenderness: There is no abdominal tenderness  Musculoskeletal:      Right lower leg: No edema  Left lower leg: No edema  Skin:     Coloration: Skin is jaundiced                 Additional Data:     Labs:    Results from last 7 days   Lab Units 21  0455   WBC Thousand/uL 10 26*   HEMOGLOBIN g/dL 10 7*   HEMATOCRIT % 33 1*   PLATELETS Thousands/uL 324   NEUTROS PCT % 72   LYMPHS PCT % 15   MONOS PCT % 8   EOS PCT % 3     Results from last 7 days   Lab Units 12/16/21  0455   POTASSIUM mmol/L 3 6   CHLORIDE mmol/L 101   CO2 mmol/L 28   BUN mg/dL 38*   CREATININE mg/dL 0 77   CALCIUM mg/dL 9 2   ALK PHOS U/L 664*   ALT U/L 111*   AST U/L 99*                       * I Have Reviewed All Lab Data     Recent Cultures (last 7 days):             Last 24 Hours Medication List:   Current Facility-Administered Medications   Medication Dose Route Frequency Provider Last Rate    aspirin  81 mg Oral Daily Carrington Manley MD      atorvastatin  40 mg Oral QPM Carrington Manley MD      cefTRIAXone  1,000 mg Intravenous Q24H Carrington Manley MD 1,000 mg (12/16/21 0030)    escitalopram  5 mg Oral HS Carrington Manley MD      hydrochlorothiazide  12 5 mg Oral Daily Carrington Manley MD      melatonin  4 5 mg Oral HS Carrington Manley MD      metoprolol tartrate  25 mg Oral BID Carrington Manley MD      metroNIDAZOLE  500 mg Oral [facility] Carrington Manley MD      ondansetron  4 mg Intravenous Q6H PRN Carrington Manley MD           VTE Pharmacologic Prophylaxis:   Pharmacologic: asa      Current Length of Stay: 2 day(s)    Current Patient Status: Inpatient       Discharge Plan: mrcp pending      Code Status: Level 1 - Full Code           Today, Patient Was Seen By: Rodgers Cranker, DO    ** Please Note: Dictation voice to text software may have been used in the creation of this document   **

## 2021-12-16 NOTE — ASSESSMENT & PLAN NOTE
Relatively painless jaundice    Concerning for either liver cancer or cholangiocarcinoma    Appreciate GI help    MRCP pending    She actually has no pain right now

## 2021-12-17 ENCOUNTER — TELEPHONE (OUTPATIENT)
Dept: MRI IMAGING | Facility: HOSPITAL | Age: 86
End: 2021-12-17

## 2021-12-17 LAB
AFP-TM SERPL-MCNC: 3 NG/ML (ref 0.5–8)
ALBUMIN SERPL BCP-MCNC: 2.1 G/DL (ref 3.5–5)
ALP SERPL-CCNC: 683 U/L (ref 46–116)
ALT SERPL W P-5'-P-CCNC: 93 U/L (ref 12–78)
ANION GAP SERPL CALCULATED.3IONS-SCNC: 7 MMOL/L (ref 4–13)
AST SERPL W P-5'-P-CCNC: 84 U/L (ref 5–45)
BILIRUB SERPL-MCNC: 4.22 MG/DL (ref 0.2–1)
BUN SERPL-MCNC: 29 MG/DL (ref 5–25)
CALCIUM ALBUM COR SERPL-MCNC: 11.1 MG/DL (ref 8.3–10.1)
CALCIUM SERPL-MCNC: 9.6 MG/DL (ref 8.3–10.1)
CEA SERPL-MCNC: 289.1 NG/ML (ref 0–3)
CHLORIDE SERPL-SCNC: 99 MMOL/L (ref 100–108)
CO2 SERPL-SCNC: 28 MMOL/L (ref 21–32)
CREAT SERPL-MCNC: 0.58 MG/DL (ref 0.6–1.3)
ERYTHROCYTE [DISTWIDTH] IN BLOOD BY AUTOMATED COUNT: 17 % (ref 11.6–15.1)
GFR SERPL CREATININE-BSD FRML MDRD: 82 ML/MIN/1.73SQ M
GLUCOSE SERPL-MCNC: 106 MG/DL (ref 65–140)
HCT VFR BLD AUTO: 31.8 % (ref 34.8–46.1)
HGB BLD-MCNC: 10.2 G/DL (ref 11.5–15.4)
MCH RBC QN AUTO: 27.6 PG (ref 26.8–34.3)
MCHC RBC AUTO-ENTMCNC: 32.1 G/DL (ref 31.4–37.4)
MCV RBC AUTO: 86 FL (ref 82–98)
PLATELET # BLD AUTO: 365 THOUSANDS/UL (ref 149–390)
PMV BLD AUTO: 10.5 FL (ref 8.9–12.7)
POTASSIUM SERPL-SCNC: 4.5 MMOL/L (ref 3.5–5.3)
PROT SERPL-MCNC: 6.8 G/DL (ref 6.4–8.2)
RBC # BLD AUTO: 3.7 MILLION/UL (ref 3.81–5.12)
SODIUM SERPL-SCNC: 134 MMOL/L (ref 136–145)
WBC # BLD AUTO: 11.52 THOUSAND/UL (ref 4.31–10.16)

## 2021-12-17 PROCEDURE — 82378 CARCINOEMBRYONIC ANTIGEN: CPT | Performed by: PHYSICIAN ASSISTANT

## 2021-12-17 PROCEDURE — 80053 COMPREHEN METABOLIC PANEL: CPT | Performed by: HOSPITALIST

## 2021-12-17 PROCEDURE — 99232 SBSQ HOSP IP/OBS MODERATE 35: CPT | Performed by: HOSPITALIST

## 2021-12-17 PROCEDURE — 86301 IMMUNOASSAY TUMOR CA 19-9: CPT | Performed by: PHYSICIAN ASSISTANT

## 2021-12-17 PROCEDURE — 82105 ALPHA-FETOPROTEIN SERUM: CPT | Performed by: PHYSICIAN ASSISTANT

## 2021-12-17 PROCEDURE — 99232 SBSQ HOSP IP/OBS MODERATE 35: CPT | Performed by: INTERNAL MEDICINE

## 2021-12-17 PROCEDURE — 85027 COMPLETE CBC AUTOMATED: CPT | Performed by: HOSPITALIST

## 2021-12-17 RX ADMIN — MELATONIN 4.5 MG: at 22:13

## 2021-12-17 RX ADMIN — CEFTRIAXONE SODIUM 1000 MG: 10 INJECTION, POWDER, FOR SOLUTION INTRAVENOUS at 01:12

## 2021-12-17 RX ADMIN — ATORVASTATIN CALCIUM 40 MG: 40 TABLET, FILM COATED ORAL at 17:20

## 2021-12-17 RX ADMIN — METRONIDAZOLE 500 MG: 500 TABLET ORAL at 22:13

## 2021-12-17 RX ADMIN — METRONIDAZOLE 500 MG: 500 TABLET ORAL at 05:51

## 2021-12-17 RX ADMIN — ASPIRIN 81 MG: 81 TABLET, COATED ORAL at 09:19

## 2021-12-17 RX ADMIN — HYDROCHLOROTHIAZIDE 12.5 MG: 12.5 TABLET ORAL at 09:20

## 2021-12-17 RX ADMIN — ENOXAPARIN SODIUM 40 MG: 40 INJECTION SUBCUTANEOUS at 10:17

## 2021-12-17 RX ADMIN — ESCITALOPRAM OXALATE 5 MG: 10 TABLET ORAL at 22:13

## 2021-12-17 RX ADMIN — METRONIDAZOLE 500 MG: 500 TABLET ORAL at 14:10

## 2021-12-17 RX ADMIN — METOPROLOL TARTRATE 25 MG: 25 TABLET, FILM COATED ORAL at 17:20

## 2021-12-17 RX ADMIN — METOPROLOL TARTRATE 25 MG: 25 TABLET, FILM COATED ORAL at 09:20

## 2021-12-17 NOTE — PROGRESS NOTES
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 cholangiocarcinoma  Will get MRI with contrast and MRCP for further characterization  Scheduled for 12/20  I also recommend liver biopsy for further evaluation  Based on MR finding she will need ERCP with stent placement into left hepatic duct  I discussed the plan with the family  Incidental radiopaque foreign body in the rectosigmoid junction and diverticulitis  Continue antibiotics  ______________________________________________________________________    Subjective:     71-year-old female with history of CVA, AFib admitted with jaundice and found to have liver mass      Medication Administration - last 24 hours from 12/16/2021 1640 to 12/17/2021 1640       Date/Time Order Dose Route Action Action by     12/17/2021 0919 aspirin (ECOTRIN LOW STRENGTH) EC tablet 81 mg 81 mg Oral Given Mayuri Oslen RN     12/16/2021 1709 atorvastatin (LIPITOR) tablet 40 mg 40 mg Oral Given Zarina Núñez RN     12/16/2021 2226 escitalopram (LEXAPRO) tablet 5 mg 5 mg Oral Given Prasanna Mejia RN     12/17/2021 0920 hydrochlorothiazide (HYDRODIURIL) tablet 12 5 mg 12 5 mg Oral Given Mayuri Olsen RN     12/16/2021 2226 melatonin tablet 4 5 mg 4 5 mg Oral Given Prasanna Mejia RN     12/17/2021 0920 metoprolol tartrate (LOPRESSOR) tablet 25 mg 25 mg Oral Given Mayuri Olsen RN     12/16/2021 1709 metoprolol tartrate (LOPRESSOR) tablet 25 mg 25 mg Oral Given Zarina Núñez RN     12/17/2021 1410 metroNIDAZOLE (FLAGYL) tablet 500 mg 500 mg Oral Given Mayuri Olsen RN     12/17/2021 0551 metroNIDAZOLE (FLAGYL) tablet 500 mg 500 mg Oral Given Prasanna Mejia RN 12/16/2021 2226 metroNIDAZOLE (FLAGYL) tablet 500 mg 500 mg Oral Given Jorge Layne RN     12/17/2021 0112 cefTRIAXone (ROCEPHIN) 1,000 mg in dextrose 5 % 50 mL IVPB 1,000 mg Intravenous New [address] Jorge Layne RN     12/17/2021 1017 enoxaparin (LOVENOX) subcutaneous injection 40 mg 40 mg Subcutaneous Given Natasha Rai RN          Objective:     Vitals: Blood pressure 128/66, pulse 80, temperature 97 9 °F (36 6 °C), resp  rate 14, SpO2 95 %  ,There is no height or weight on file to calculate BMI      No intake or output data in the 24 hours ending 12/17/21 1640    Physical Exam:   General Appearance: Awake and alert, in no acute distress  Abdomen: Soft, non-tender, non-distended; bowel sounds normal; no masses or no organomegaly    Invasive Devices  Report    Peripheral Intravenous Line            Peripheral IV 12/15/21 Right;Ventral (anterior) Forearm 2 days                Lab Results:  Admission on 12/14/2021   Component Date Value    WBC 12/14/2021 12 12*    RBC 12/14/2021 4 29     Hemoglobin 12/14/2021 12 2     Hematocrit 12/14/2021 38 1     MCV 12/14/2021 89     MCH 12/14/2021 28 4     MCHC 12/14/2021 32 0     RDW 12/14/2021 16 2*    MPV 12/14/2021 11 4     Platelets 43/37/5343 352     nRBC 12/14/2021 0     Neutrophils Relative 12/14/2021 74     Immat GRANS % 12/14/2021 2     Lymphocytes Relative 12/14/2021 13*    Monocytes Relative 12/14/2021 8     Eosinophils Relative 12/14/2021 2     Basophils Relative 12/14/2021 1     Neutrophils Absolute 12/14/2021 9 06*    Immature Grans Absolute 12/14/2021 0 18     Lymphocytes Absolute 12/14/2021 1 62     Monocytes Absolute 12/14/2021 0 94     Eosinophils Absolute 12/14/2021 0 26     Basophils Absolute 12/14/2021 0 06     Sodium 12/14/2021 138     Potassium 12/14/2021 3 0*    Chloride 12/14/2021 97*    CO2 12/14/2021 30     ANION GAP 12/14/2021 11     BUN 12/14/2021 44*    Creatinine 12/14/2021 0 69     Glucose 12/14/2021 117     Calcium 12/14/2021 10 1     eGFR 12/14/2021 77     Total Bilirubin 12/14/2021 6 33*    Bilirubin, Direct 12/14/2021 5 03*    Alkaline Phosphatase 12/14/2021 771*    AST 12/14/2021 155*    ALT 12/14/2021 187*    Total Protein 12/14/2021 8 4*    Albumin 12/14/2021 2 6*    Acetaminophen Level 12/14/2021 <2*    NT-proBNP 12/14/2021 3,168*    hs TnI 0hr 12/14/2021 42     hs TnI 2hr 12/14/2021 41     Delta 2hr hsTnI 12/14/2021 -1     hs TnI 4hr 12/15/2021 42     Delta 4hr hsTnI 12/15/2021 0     Ventricular Rate 12/14/2021 80     Atrial Rate 12/14/2021 214     QRSD Interval 12/14/2021 90     QT Interval 12/14/2021 350     QTC Interval 12/14/2021 403     QRS Axis 12/14/2021 57     T Wave Axis 12/14/2021 104     Ventricular Rate 12/14/2021 82     Atrial Rate 12/14/2021 159     QRSD Interval 12/14/2021 88     QT Interval 12/14/2021 350     QTC Interval 12/14/2021 408     QRS Axis 12/14/2021 57     T Wave Axis 12/14/2021 106     Color, UA 12/14/2021 Yellow     Clarity, UA 12/14/2021 Clear     pH, UA 12/14/2021 5 0     Leukocytes, UA 12/14/2021 Trace*    Nitrite, UA 12/14/2021 Negative     Protein, UA 12/14/2021 Negative     Glucose, UA 12/14/2021 Negative     Ketones, UA 12/14/2021 Negative     Urobilinogen, UA 12/14/2021 4 0*    Bilirubin, UA 12/14/2021 Interference- unable to analyze*    Blood, UA 12/14/2021 Trace*    Specific [place], UA 12/14/2021 1 010     RBC, UA 12/14/2021 1-2*    WBC, UA 12/14/2021 1-2*    Epithelial Cells 12/14/2021 Occasional     Bacteria, UA 12/14/2021 Occasional     Sodium 12/15/2021 140     Potassium 12/15/2021 3 7     Chloride 12/15/2021 102     CO2 12/15/2021 27     ANION GAP 12/15/2021 11     BUN 12/15/2021 39*    Creatinine 12/15/2021 0 69     Glucose 12/15/2021 96     Calcium 12/15/2021 9 6     Corrected Calcium 12/15/2021 11 1*    AST 12/15/2021 106*    ALT 12/15/2021 131*    Alkaline Phosphatase 12/15/2021 623*    Total Protein 12/15/2021 6 9     Albumin 12/15/2021 2 1*    Total Bilirubin 12/15/2021 4 81*    eGFR 12/15/2021 77     WBC 12/15/2021 10 58*    RBC 12/15/2021 3 82     Hemoglobin 12/15/2021 10 5*    Hematocrit 12/15/2021 32 5*    MCV 12/15/2021 85     MCH 12/15/2021 27 5     MCHC 12/15/2021 32 3     RDW 12/15/2021 16 5*    MPV 12/15/2021 10 6     Platelets 68/20/8480 329     nRBC 12/15/2021 0     Neutrophils Relative 12/15/2021 75     Immat GRANS % 12/15/2021 1     Lymphocytes Relative 12/15/2021 14     Monocytes Relative 12/15/2021 8     Eosinophils Relative 12/15/2021 2     Basophils Relative 12/15/2021 0     Neutrophils Absolute 12/15/2021 7 87*    Immature Grans Absolute 12/15/2021 0 12     Lymphocytes Absolute 12/15/2021 1 50     Monocytes Absolute 12/15/2021 0 87     Eosinophils Absolute 12/15/2021 0 18     Basophils Absolute 12/15/2021 0 04     WBC 12/16/2021 10 26*    RBC 12/16/2021 3 77*    Hemoglobin 12/16/2021 10 7*    Hematocrit 12/16/2021 33 1*    MCV 12/16/2021 88     MCH 12/16/2021 28 4     MCHC 12/16/2021 32 3     RDW 12/16/2021 16 4*    MPV 12/16/2021 10 6     Platelets 20/93/2602 324     nRBC 12/16/2021 0     Neutrophils Relative 12/16/2021 72     Immat GRANS % 12/16/2021 2     Lymphocytes Relative 12/16/2021 15     Monocytes Relative 12/16/2021 8     Eosinophils Relative 12/16/2021 3     Basophils Relative 12/16/2021 0     Neutrophils Absolute 12/16/2021 7 44     Immature Grans Absolute 12/16/2021 0 16     Lymphocytes Absolute 12/16/2021 1 52     Monocytes Absolute 12/16/2021 0 81     Eosinophils Absolute 12/16/2021 0 30     Basophils Absolute 12/16/2021 0 03     Sodium 12/16/2021 139     Potassium 12/16/2021 3 6     Chloride 12/16/2021 101     CO2 12/16/2021 28     ANION GAP 12/16/2021 10     BUN 12/16/2021 38*    Creatinine 12/16/2021 0 77     Glucose 12/16/2021 116     Calcium 12/16/2021 9 2     eGFR 12/16/2021 69     Magnesium 12/16/2021 2 3     Total Bilirubin 12/16/2021 4 40*    Bilirubin, Direct 12/16/2021 3 32*    Alkaline Phosphatase 12/16/2021 664*    AST 12/16/2021 99*    ALT 12/16/2021 111*    Total Protein 12/16/2021 6 7     Albumin 12/16/2021 2 0*    AFP TUMOR MARKER 12/17/2021 3 0     CEA 12/17/2021 289 1*    WBC 12/17/2021 11 52*    RBC 12/17/2021 3 70*    Hemoglobin 12/17/2021 10 2*    Hematocrit 12/17/2021 31 8*    MCV 12/17/2021 86     MCH 12/17/2021 27 6     MCHC 12/17/2021 32 1     RDW 12/17/2021 17 0*    Platelets 95/45/4324 365     MPV 12/17/2021 10 5     Sodium 12/17/2021 134*    Potassium 12/17/2021 4 5     Chloride 12/17/2021 99*    CO2 12/17/2021 28     ANION GAP 12/17/2021 7     BUN 12/17/2021 29*    Creatinine 12/17/2021 0 58*    Glucose 12/17/2021 106     Calcium 12/17/2021 9 6     Corrected Calcium 12/17/2021 11 1*    AST 12/17/2021 84*    ALT 12/17/2021 93*    Alkaline Phosphatase 12/17/2021 683*    Total Protein 12/17/2021 6 8     Albumin 12/17/2021 2 1*    Total Bilirubin 12/17/2021 4 22*    eGFR 12/17/2021 82        Imaging Studies: I have personally reviewed pertinent imaging studies

## 2021-12-17 NOTE — PROGRESS NOTES
[address] [place] [address]  Progress Note Layton AgLocal 1933, 80 y o  female MRN: [id number]  Unit/Bed#: E5 -01 Encounter: [id number]  Primary Care Provider: Scout Hdez DO   Date and time admitted to hospital: 2021  1:10 PM    * Jaundice  Assessment & Plan  Relatively painless jaundice  Concerning for either liver cancer or cholangiocarcinoma    Appreciate GI help    MRCP pending for further evaluation and then may need ERCP/stenting depending on what is found        History of CVA (cerebrovascular accident)  Assessment & Plan  · Recent right MCA infarct at [facility] 2021  · No residual deficits  · Maintained on aspirin, Eliquis, statin as an outpatient    Holding eliquis as she may need ERCP with stenting    Atrial fibrillation ([address] )  Assessment & Plan  · Maintained on metoprolol for rate control      Would hold anticoagulation for upcoming procedures            Subjective:   Doing well  No abdominal pain  No pain anywhere  Objective:     Vitals:   Temp (24hrs), Av 8 °F (36 6 °C), Min:97 8 °F (36 6 °C), Max:97 9 °F (36 6 °C)    Temp:  [97 8 °F (36 6 °C)-97 9 °F (36 6 °C)] 97 8 °F (36 6 °C)  HR:  [74-94] 94  Resp:  [17-18] 18  BP: (101-133)/(62-72) 128/72  SpO2:  [93 %-95 %] 94 %  There is no height or weight on file to calculate BMI  Input and Output Summary (last 24 hours):     No intake or output data in the 24 hours ending 21 0941    Physical Exam:     Physical Exam  Vitals and nursing note reviewed  HENT:      Head: Normocephalic and atraumatic  Eyes:      Pupils: Pupils are equal, round, and reactive to light  Cardiovascular:      Rate and Rhythm: Normal rate and regular rhythm  Heart sounds: No murmur heard  No friction rub  No gallop  Pulmonary:      Effort: Pulmonary effort is normal       Breath sounds: Normal breath sounds  No wheezing or rales  Abdominal:      General: Bowel sounds are normal       Palpations: Abdomen is soft  Tenderness: There is no abdominal tenderness  Musculoskeletal:      Right lower leg: No edema  Left lower leg: No edema          Additional Data:     Labs:    Results from last 7 days   Lab Units 12/17/21  0503 12/16/21  0455 12/16/21  0455   WBC Thousand/uL 11 52*   < > 10 26*   HEMOGLOBIN g/dL 10 2*   < > 10 7*   HEMATOCRIT % 31 8*   < > 33 1*   PLATELETS Thousands/uL 365   < > 324   NEUTROS PCT %  --   --  72   LYMPHS PCT %  --   --  15   MONOS PCT %  --   --  8   EOS PCT %  --   --  3    < > = values in this interval not displayed  Results from last 7 days   Lab Units 12/17/21  0503   POTASSIUM mmol/L 4 5   CHLORIDE mmol/L 99*   CO2 mmol/L 28   BUN mg/dL 29*   CREATININE mg/dL 0 58*   CALCIUM mg/dL 9 6   ALK PHOS U/L 683*   ALT U/L 93*   AST U/L 84*                       * I Have Reviewed All Lab Data     Recent Cultures (last 7 days):             Last 24 Hours Medication List:   Current Facility-Administered Medications   Medication Dose Route Frequency Provider Last Rate    aspirin  81 mg Oral Daily Susan Singleton MD      atorvastatin  40 mg Oral QPM Susan Singleton MD      cefTRIAXone  1,000 mg Intravenous Q24H Susan Singleton MD 1,000 mg (12/17/21 0112)    escitalopram  5 mg Oral HS Susan Singleton MD      hydrochlorothiazide  12 5 mg Oral Daily Susan Singleton MD      melatonin  4 5 mg Oral HS Susan Singleton MD      metoprolol tartrate  25 mg Oral BID Susan Singleton MD      metroNIDAZOLE  500 mg Oral Q8H Edwin Hightower MD      ondansetron  4 mg Intravenous Q6H PRN Susan Singleton MD           VTE Pharmacologic Prophylaxis:   Pharmacologic: add lovenox      Current Length of Stay: 3 day(s)    Current Patient Status: Inpatient       Discharge Plan: need MRCP and then likely ERCP    Code Status: Level 1 - Full Code           Today, Patient Was Seen By: Gregory Amaya DO    ** Please Note: Dictation voice to text software may have been used in the creation of this document   **

## 2021-12-17 NOTE — ASSESSMENT & PLAN NOTE
· Recent right MCA infarct at [facility] 11/2021  · No residual deficits  · Maintained on aspirin, Eliquis, statin as an outpatient    Holding eliquis as she may need ERCP with stenting

## 2021-12-17 NOTE — ASSESSMENT & PLAN NOTE
Relatively painless jaundice    Concerning for either liver cancer or cholangiocarcinoma    Appreciate GI help    MRCP pending for further evaluation and then may need ERCP/stenting depending on what is found

## 2021-12-18 LAB
ALBUMIN SERPL BCP-MCNC: 2.2 G/DL (ref 3.5–5)
ALP SERPL-CCNC: 743 U/L (ref 46–116)
ALT SERPL W P-5'-P-CCNC: 79 U/L (ref 12–78)
ANION GAP SERPL CALCULATED.3IONS-SCNC: 11 MMOL/L (ref 4–13)
AST SERPL W P-5'-P-CCNC: 81 U/L (ref 5–45)
BILIRUB SERPL-MCNC: 3.73 MG/DL (ref 0.2–1)
BUN SERPL-MCNC: 31 MG/DL (ref 5–25)
CALCIUM ALBUM COR SERPL-MCNC: 11.1 MG/DL (ref 8.3–10.1)
CALCIUM SERPL-MCNC: 9.7 MG/DL (ref 8.3–10.1)
CANCER AG19-9 SERPL-ACNC: ABNORMAL U/ML (ref 0–35)
CHLORIDE SERPL-SCNC: 99 MMOL/L (ref 100–108)
CO2 SERPL-SCNC: 28 MMOL/L (ref 21–32)
CREAT SERPL-MCNC: 0.68 MG/DL (ref 0.6–1.3)
ERYTHROCYTE [DISTWIDTH] IN BLOOD BY AUTOMATED COUNT: 17 % (ref 11.6–15.1)
GFR SERPL CREATININE-BSD FRML MDRD: 78 ML/MIN/1.73SQ M
GLUCOSE SERPL-MCNC: 99 MG/DL (ref 65–140)
HCT VFR BLD AUTO: 34.3 % (ref 34.8–46.1)
HGB BLD-MCNC: 11.1 G/DL (ref 11.5–15.4)
MCH RBC QN AUTO: 28.8 PG (ref 26.8–34.3)
MCHC RBC AUTO-ENTMCNC: 32.4 G/DL (ref 31.4–37.4)
MCV RBC AUTO: 89 FL (ref 82–98)
PLATELET # BLD AUTO: 362 THOUSANDS/UL (ref 149–390)
PMV BLD AUTO: 11 FL (ref 8.9–12.7)
POTASSIUM SERPL-SCNC: 3.6 MMOL/L (ref 3.5–5.3)
PROT SERPL-MCNC: 7 G/DL (ref 6.4–8.2)
RBC # BLD AUTO: 3.86 MILLION/UL (ref 3.81–5.12)
SODIUM SERPL-SCNC: 138 MMOL/L (ref 136–145)
WBC # BLD AUTO: 11.15 THOUSAND/UL (ref 4.31–10.16)

## 2021-12-18 PROCEDURE — 85027 COMPLETE CBC AUTOMATED: CPT | Performed by: HOSPITALIST

## 2021-12-18 PROCEDURE — 80053 COMPREHEN METABOLIC PANEL: CPT | Performed by: HOSPITALIST

## 2021-12-18 PROCEDURE — 99232 SBSQ HOSP IP/OBS MODERATE 35: CPT | Performed by: HOSPITALIST

## 2021-12-18 RX ADMIN — METOPROLOL TARTRATE 25 MG: 25 TABLET, FILM COATED ORAL at 18:19

## 2021-12-18 RX ADMIN — ENOXAPARIN SODIUM 40 MG: 40 INJECTION SUBCUTANEOUS at 09:43

## 2021-12-18 RX ADMIN — METOPROLOL TARTRATE 25 MG: 25 TABLET, FILM COATED ORAL at 09:43

## 2021-12-18 RX ADMIN — CEFTRIAXONE SODIUM 1000 MG: 10 INJECTION, POWDER, FOR SOLUTION INTRAVENOUS at 01:15

## 2021-12-18 RX ADMIN — ASPIRIN 81 MG: 81 TABLET, COATED ORAL at 09:43

## 2021-12-18 RX ADMIN — METRONIDAZOLE 500 MG: 500 TABLET ORAL at 14:54

## 2021-12-18 RX ADMIN — MELATONIN 4.5 MG: at 22:32

## 2021-12-18 RX ADMIN — ESCITALOPRAM OXALATE 5 MG: 10 TABLET ORAL at 22:32

## 2021-12-18 RX ADMIN — ATORVASTATIN CALCIUM 40 MG: 40 TABLET, FILM COATED ORAL at 18:19

## 2021-12-18 RX ADMIN — METRONIDAZOLE 500 MG: 500 TABLET ORAL at 22:32

## 2021-12-18 RX ADMIN — HYDROCHLOROTHIAZIDE 12.5 MG: 12.5 TABLET ORAL at 09:43

## 2021-12-18 RX ADMIN — METRONIDAZOLE 500 MG: 500 TABLET ORAL at 06:00

## 2021-12-18 NOTE — PLAN OF CARE
Problem: Potential for Falls  Goal: Patient will remain free of falls  Description: INTERVENTIONS:  - Educate patient/family on patient safety including physical limitations  - Instruct patient to call for assistance with activity   - Consult OT/PT to assist with strengthening/mobility   - Keep Call bell within reach  - Keep bed low and locked with side rails adjusted as appropriate  - Keep care items and personal belongings within reach  - Initiate and maintain comfort rounds  - Make Fall Risk Sign visible to staff  - Offer Toileting every Hours, in advance of need  - Initiate/Maintain alarm  - Obtain necessary fall risk management equipment:   - Apply yellow socks and bracelet for high fall risk patients  - Consider moving patient to room near nurses station  Outcome: Progressing     Problem: MOBILITY - ADULT  Goal: Maintain or return to baseline ADL function  Description: INTERVENTIONS:  -  Assess patient's ability to carry out ADLs; assess patient's baseline for ADL function and identify physical deficits which impact ability to perform ADLs (bathing, care of mouth/teeth, toileting, grooming, dressing, etc )  - Assess/evaluate cause of self-care deficits   - Assess range of motion  - Assess patient's mobility; develop plan if impaired  - Assess patient's need for assistive devices and provide as appropriate  - Encourage maximum independence but intervene and supervise when necessary  - Involve family in performance of ADLs  - Assess for home care needs following discharge   - Consider OT consult to assist with ADL evaluation and planning for discharge  - Provide patient education as appropriate  Outcome: Progressing  Goal: Maintains/Returns to pre admission functional level  Description: INTERVENTIONS:  - Perform BMAT or MOVE assessment daily    - Set and communicate daily mobility goal to care team and patient/family/caregiver     - Collaborate with rehabilitation services on mobility goals if consulted  - Perform Range of Motion  times a day  - Reposition patient every  hours  - Dangle patient  times a day  - Stand patient  times a day  - Ambulate patient  times a day  - Out of bed to chair  times a day   - Out of bed for meals times a day  - Out of bed for toileting  - Record patient progress and toleration of activity level   Outcome: Progressing     Problem: Prexisting or High Potential for Compromised Skin Integrity  Goal: Skin integrity is maintained or improved  Description: INTERVENTIONS:  - Identify patients at risk for skin breakdown  - Assess and monitor skin integrity  - Assess and monitor nutrition and hydration status  - Monitor labs   - Assess for incontinence   - Turn and reposition patient  - Assist with mobility/ambulation  - Relieve pressure over bony prominences  - Avoid friction and shearing  - Provide appropriate hygiene as needed including keeping skin clean and dry  - Evaluate need for skin moisturizer/barrier cream  - Collaborate with interdisciplinary team   - Patient/family teaching  - Consider wound care consult   Outcome: Progressing     Problem: Nutrition/Hydration-ADULT  Goal: Nutrient/Hydration intake appropriate for improving, restoring or maintaining nutritional needs  Description: Monitor and assess patient's nutrition/hydration status for malnutrition  Collaborate with interdisciplinary team and initiate plan and interventions as ordered  Monitor patient's weight and dietary intake as ordered or per policy  Utilize nutrition screening tool and intervene as necessary  Determine patient's food preferences and provide high-protein, high-caloric foods as appropriate       INTERVENTIONS:  - Monitor oral intake, urinary output, labs, and treatment plans  - Assess nutrition and hydration status and recommend course of action  - Evaluate amount of meals eaten  - Assist patient with eating if necessary   - Allow adequate time for meals  - Recommend/ encourage appropriate diets, oral nutritional supplements, and vitamin/mineral supplements  - Order, calculate, and assess calorie counts as needed  - Recommend, monitor, and adjust tube feedings and TPN/PPN based on assessed needs  - Assess need for intravenous fluids  - Provide specific nutrition/hydration education as appropriate  - Include patient/family/caregiver in decisions related to nutrition  Outcome: Progressing

## 2021-12-18 NOTE — ASSESSMENT & PLAN NOTE
Relatively painless jaundice  Concerning for either liver cancer or cholangiocarcinoma    Appreciate GI help    We were trying to get MRCP  But she has a pacemaker, so we may not be able to get that      I will see if GI would recommend going for with an ERCP or liver biopsy

## 2021-12-19 LAB
ALBUMIN SERPL BCP-MCNC: 2 G/DL (ref 3.5–5)
ALP SERPL-CCNC: 743 U/L (ref 46–116)
ALT SERPL W P-5'-P-CCNC: 66 U/L (ref 12–78)
ANION GAP SERPL CALCULATED.3IONS-SCNC: 9 MMOL/L (ref 4–13)
AST SERPL W P-5'-P-CCNC: 81 U/L (ref 5–45)
BASOPHILS # BLD AUTO: 0.06 THOUSANDS/ΜL (ref 0–0.1)
BASOPHILS NFR BLD AUTO: 1 % (ref 0–1)
BILIRUB SERPL-MCNC: 3.2 MG/DL (ref 0.2–1)
BUN SERPL-MCNC: 28 MG/DL (ref 5–25)
CALCIUM ALBUM COR SERPL-MCNC: 11 MG/DL (ref 8.3–10.1)
CALCIUM SERPL-MCNC: 9.4 MG/DL (ref 8.3–10.1)
CHLORIDE SERPL-SCNC: 98 MMOL/L (ref 100–108)
CO2 SERPL-SCNC: 30 MMOL/L (ref 21–32)
CREAT SERPL-MCNC: 0.66 MG/DL (ref 0.6–1.3)
EOSINOPHIL # BLD AUTO: 0.23 THOUSAND/ΜL (ref 0–0.61)
EOSINOPHIL NFR BLD AUTO: 2 % (ref 0–6)
ERYTHROCYTE [DISTWIDTH] IN BLOOD BY AUTOMATED COUNT: 17.4 % (ref 11.6–15.1)
GFR SERPL CREATININE-BSD FRML MDRD: 79 ML/MIN/1.73SQ M
GLUCOSE SERPL-MCNC: 95 MG/DL (ref 65–140)
HCT VFR BLD AUTO: 33.2 % (ref 34.8–46.1)
HGB BLD-MCNC: 10.6 G/DL (ref 11.5–15.4)
IMM GRANULOCYTES # BLD AUTO: 0.1 THOUSAND/UL (ref 0–0.2)
IMM GRANULOCYTES NFR BLD AUTO: 1 % (ref 0–2)
LYMPHOCYTES # BLD AUTO: 1.71 THOUSANDS/ΜL (ref 0.6–4.47)
LYMPHOCYTES NFR BLD AUTO: 14 % (ref 14–44)
MCH RBC QN AUTO: 27.7 PG (ref 26.8–34.3)
MCHC RBC AUTO-ENTMCNC: 31.9 G/DL (ref 31.4–37.4)
MCV RBC AUTO: 87 FL (ref 82–98)
MONOCYTES # BLD AUTO: 1 THOUSAND/ΜL (ref 0.17–1.22)
MONOCYTES NFR BLD AUTO: 8 % (ref 4–12)
NEUTROPHILS # BLD AUTO: 9.44 THOUSANDS/ΜL (ref 1.85–7.62)
NEUTS SEG NFR BLD AUTO: 74 % (ref 43–75)
NRBC BLD AUTO-RTO: 0 /100 WBCS
PLATELET # BLD AUTO: 378 THOUSANDS/UL (ref 149–390)
PMV BLD AUTO: 10.8 FL (ref 8.9–12.7)
POTASSIUM SERPL-SCNC: 3.3 MMOL/L (ref 3.5–5.3)
PROT SERPL-MCNC: 6.8 G/DL (ref 6.4–8.2)
RBC # BLD AUTO: 3.82 MILLION/UL (ref 3.81–5.12)
SODIUM SERPL-SCNC: 137 MMOL/L (ref 136–145)
WBC # BLD AUTO: 12.54 THOUSAND/UL (ref 4.31–10.16)

## 2021-12-19 PROCEDURE — 85025 COMPLETE CBC W/AUTO DIFF WBC: CPT | Performed by: HOSPITALIST

## 2021-12-19 PROCEDURE — 80053 COMPREHEN METABOLIC PANEL: CPT | Performed by: HOSPITALIST

## 2021-12-19 PROCEDURE — 99232 SBSQ HOSP IP/OBS MODERATE 35: CPT | Performed by: HOSPITALIST

## 2021-12-19 RX ADMIN — METOPROLOL TARTRATE 25 MG: 25 TABLET, FILM COATED ORAL at 08:53

## 2021-12-19 RX ADMIN — ENOXAPARIN SODIUM 40 MG: 40 INJECTION SUBCUTANEOUS at 08:53

## 2021-12-19 RX ADMIN — ATORVASTATIN CALCIUM 40 MG: 40 TABLET, FILM COATED ORAL at 17:56

## 2021-12-19 RX ADMIN — HYDROCHLOROTHIAZIDE 12.5 MG: 12.5 TABLET ORAL at 08:54

## 2021-12-19 RX ADMIN — METOPROLOL TARTRATE 25 MG: 25 TABLET, FILM COATED ORAL at 17:56

## 2021-12-19 RX ADMIN — METRONIDAZOLE 500 MG: 500 TABLET ORAL at 14:23

## 2021-12-19 RX ADMIN — CEFTRIAXONE SODIUM 1000 MG: 10 INJECTION, POWDER, FOR SOLUTION INTRAVENOUS at 01:36

## 2021-12-19 RX ADMIN — MELATONIN 4.5 MG: at 21:40

## 2021-12-19 RX ADMIN — ESCITALOPRAM OXALATE 5 MG: 10 TABLET ORAL at 21:41

## 2021-12-19 RX ADMIN — ASPIRIN 81 MG: 81 TABLET, COATED ORAL at 08:54

## 2021-12-19 RX ADMIN — METRONIDAZOLE 500 MG: 500 TABLET ORAL at 21:41

## 2021-12-19 RX ADMIN — METRONIDAZOLE 500 MG: 500 TABLET ORAL at 05:31

## 2021-12-19 NOTE — PLAN OF CARE
Problem: Potential for Falls  Goal: Patient will remain free of falls  Description: INTERVENTIONS:  - Educate patient/family on patient safety including physical limitations  - Instruct patient to call for assistance with activity   - Consult OT/PT to assist with strengthening/mobility   - Keep Call bell within reach  - Keep bed low and locked with side rails adjusted as appropriate  - Keep care items and personal belongings within reach  - Initiate and maintain comfort rounds  - Make Fall Risk Sign visible to staff  - - Apply yellow socks and bracelet for high fall risk patients  - Consider moving patient to room near nurses station  Outcome: Progressing     Problem: MOBILITY - ADULT  Goal: Maintain or return to baseline ADL function  Description: INTERVENTIONS:  -  Assess patient's ability to carry out ADLs; assess patient's baseline for ADL function and identify physical deficits which impact ability to perform ADLs (bathing, care of mouth/teeth, toileting, grooming, dressing, etc )  - Assess/evaluate cause of self-care deficits   - Assess range of motion  - Assess patient's mobility; develop plan if impaired  - Assess patient's need for assistive devices and provide as appropriate  - Encourage maximum independence but intervene and supervise when necessary  - Involve family in performance of ADLs  - Assess for home care needs following discharge   - Consider OT consult to assist with ADL evaluation and planning for discharge  - Provide patient education as appropriate  Outcome: Progressing  Goal: Maintains/Returns to pre admission functional level  Description: INTERVENTIONS:  - Perform BMAT or MOVE assessment daily    - Set and communicate daily mobility goal to care team and patient/family/caregiver     - Collaborate with rehabilitation services on mobility goals if consulted  - - Out of bed for toileting  - Record patient progress and toleration of activity level   Outcome: Progressing     Problem: Prexisting or High Potential for Compromised Skin Integrity  Goal: Skin integrity is maintained or improved  Description: INTERVENTIONS:  - Identify patients at risk for skin breakdown  - Assess and monitor skin integrity  - Assess and monitor nutrition and hydration status  - Monitor labs   - Assess for incontinence   - Turn and reposition patient  - Assist with mobility/ambulation  - Relieve pressure over bony prominences  - Avoid friction and shearing  - Provide appropriate hygiene as needed including keeping skin clean and dry  - Evaluate need for skin moisturizer/barrier cream  - Collaborate with interdisciplinary team   - Patient/family teaching  - Consider wound care consult   Outcome: Progressing     Problem: Nutrition/Hydration-ADULT  Goal: Nutrient/Hydration intake appropriate for improving, restoring or maintaining nutritional needs  Description: Monitor and assess patient's nutrition/hydration status for malnutrition  Collaborate with interdisciplinary team and initiate plan and interventions as ordered  Monitor patient's weight and dietary intake as ordered or per policy  Utilize nutrition screening tool and intervene as necessary  Determine patient's food preferences and provide high-protein, high-caloric foods as appropriate       INTERVENTIONS:  - Monitor oral intake, urinary output, labs, and treatment plans  - Assess nutrition and hydration status and recommend course of action  - Evaluate amount of meals eaten  - Assist patient with eating if necessary   - Allow adequate time for meals  - Recommend/ encourage appropriate diets, oral nutritional supplements, and vitamin/mineral supplements  - Order, calculate, and assess calorie counts as needed  - Recommend, monitor, and adjust tube feedings and TPN/PPN based on assessed needs  - Assess need for intravenous fluids  - Provide specific nutrition/hydration education as appropriate  - Include patient/family/caregiver in decisions related to nutrition  Outcome: Progressing

## 2021-12-19 NOTE — PROGRESS NOTES
[address] [place] [address]  Progress Note Joelle Sheffield 1933, 80 y o  female MRN: [id number]  Unit/Bed#: E5 -01 Encounter: [id number]  Primary Care Provider: Eva Anderson DO   Date and time admitted to hospital: 2021  1:10 PM    * Jaundice  Assessment & Plan  Found to have a concerning liver or bile duct mass    concerning for either liver cancer or cholangiocarcinoma    Appreciate GI help    GI recommends liver biopsy by IR  Hopefully we will be able to get tomorrow  I have a call out to [address] to interrogate her pacemaker to see if it is MRI compatible  I researched all her [facility] records that he could find tonight in find anything with an actual model that she had placed in 2021 at [facility]      Atrial fibrillation [facility])  Assessment & Plan  · Maintained on metoprolol for rate control      Would hold anticoagulation for upcoming procedures          Subjective:   Feels better  No abdominal pain  She is much less yellow  She is hoping to get the biopsy tomorrow    Spoke with her using       Objective:     Vitals:   Temp (24hrs), Av 5 °F (36 4 °C), Min:97 3 °F (36 3 °C), Max:97 7 °F (36 5 °C)    Temp:  [97 3 °F (36 3 °C)-97 7 °F (36 5 °C)] 97 6 °F (36 4 °C)  HR:  [75-98] 80  Resp:  [18-19] 18  BP: (130-159)/(64-80) 132/64  SpO2:  [91 %-98 %] 91 %  There is no height or weight on file to calculate BMI  Input and Output Summary (last 24 hours):     No intake or output data in the 24 hours ending 21 1702    Physical Exam:     Physical Exam  Vitals and nursing note reviewed  HENT:      Head: Normocephalic and atraumatic  Eyes:      Pupils: Pupils are equal, round, and reactive to light  Cardiovascular:      Rate and Rhythm: Normal rate and regular rhythm  Heart sounds: No murmur heard  No friction rub  No gallop  Pulmonary:      Effort: Pulmonary effort is normal       Breath sounds: Normal breath sounds  No wheezing or rales  Abdominal:      General: Bowel sounds are normal       Palpations: Abdomen is soft  Tenderness: There is no abdominal tenderness  Musculoskeletal:      Right lower leg: No edema  Left lower leg: No edema  Skin:     Coloration: Skin is jaundiced                 Additional Data:     Labs:    Results from last 7 days   Lab Units 12/19/21  0431   WBC Thousand/uL 12 54*   HEMOGLOBIN g/dL 10 6*   HEMATOCRIT % 33 2*   PLATELETS Thousands/uL 378   NEUTROS PCT % 74   LYMPHS PCT % 14   MONOS PCT % 8   EOS PCT % 2     Results from last 7 days   Lab Units 12/19/21  0431   POTASSIUM mmol/L 3 3*   CHLORIDE mmol/L 98*   CO2 mmol/L 30   BUN mg/dL 28*   CREATININE mg/dL 0 66   CALCIUM mg/dL 9 4   ALK PHOS U/L 743*   ALT U/L 66   AST U/L 81*                       * I Have Reviewed All Lab Data     Recent Cultures (last 7 days):             Last 24 Hours Medication List:   Current Facility-Administered Medications   Medication Dose Route Frequency Provider Last Rate    aspirin  81 mg Oral Daily Rosaura Law MD      atorvastatin  40 mg Oral QPM Rosaura Law MD      cefTRIAXone  1,000 mg Intravenous Q24H Rosaura Law MD 1,000 mg (12/19/21 0136)    enoxaparin  40 mg Subcutaneous Q24H [facility] Kiko Miller DO      escitalopram  5 mg Oral HS Rosaura Law MD      hydrochlorothiazide  12 5 mg Oral Daily Rosaura Law MD      melatonin  4 5 mg Oral HS Rosaura Law MD      metoprolol tartrate  25 mg Oral BID Rosaura Law MD      metroNIDAZOLE  500 mg Oral [facility] Rosaura Law MD      ondansetron  4 mg Intravenous Q6H PRN Rosaura Law MD           VTE Pharmacologic Prophylaxis:   Pharmacologic: Enoxaparin (Lovenox)      Current Length of Stay: 5 day(s)    Current Patient Status: Inpatient       Discharge Plan: liver biopsy tomorrow    Code Status: Level 1 - Full Code           Today, Patient Was Seen By: Can Stuart DO    ** Please Note: Dictation voice to text software may have been used in the creation of this document   **

## 2021-12-19 NOTE — QUICK NOTE
Received notification from Internal Medicine that MRCP may not be feasible given she has a pacemaker and we are unable to determine if this compatible or not  If MRCP is in fact not possible would recommend liver biopsy  Attempted to call the family to discuss this further without an answer  Will try again tomorrow

## 2021-12-19 NOTE — ASSESSMENT & PLAN NOTE
Found to have a concerning liver or bile duct mass    concerning for either liver cancer or cholangiocarcinoma    Appreciate GI help    GI recommends liver biopsy by IR  Hopefully we will be able to get tomorrow  I have a call out to [address] to interrogate her pacemaker to see if it is MRI compatible    I researched all her [facility] records that he could find tonight in find anything with an actual model that she had placed in January 2021 at [facility]

## 2021-12-20 ENCOUNTER — TELEPHONE (OUTPATIENT)
Dept: MRI IMAGING | Facility: HOSPITAL | Age: 86
End: 2021-12-20

## 2021-12-20 ENCOUNTER — APPOINTMENT (INPATIENT)
Dept: MRI IMAGING | Facility: HOSPITAL | Age: 86
DRG: 436 | End: 2021-12-20
Payer: COMMERCIAL

## 2021-12-20 PROBLEM — R16.0 LIVER MASS: Status: ACTIVE | Noted: 2021-12-20

## 2021-12-20 LAB
ALBUMIN SERPL BCP-MCNC: 2.1 G/DL (ref 3.5–5)
ALP SERPL-CCNC: 758 U/L (ref 46–116)
ALT SERPL W P-5'-P-CCNC: 61 U/L (ref 12–78)
ANION GAP SERPL CALCULATED.3IONS-SCNC: 8 MMOL/L (ref 4–13)
AST SERPL W P-5'-P-CCNC: 90 U/L (ref 5–45)
BASOPHILS # BLD AUTO: 0.07 THOUSANDS/ΜL (ref 0–0.1)
BASOPHILS NFR BLD AUTO: 1 % (ref 0–1)
BILIRUB SERPL-MCNC: 2.7 MG/DL (ref 0.2–1)
BUN SERPL-MCNC: 27 MG/DL (ref 5–25)
CALCIUM ALBUM COR SERPL-MCNC: 11 MG/DL (ref 8.3–10.1)
CALCIUM SERPL-MCNC: 9.5 MG/DL (ref 8.3–10.1)
CHLORIDE SERPL-SCNC: 98 MMOL/L (ref 100–108)
CO2 SERPL-SCNC: 30 MMOL/L (ref 21–32)
CREAT SERPL-MCNC: 0.76 MG/DL (ref 0.6–1.3)
EOSINOPHIL # BLD AUTO: 0.31 THOUSAND/ΜL (ref 0–0.61)
EOSINOPHIL NFR BLD AUTO: 2 % (ref 0–6)
ERYTHROCYTE [DISTWIDTH] IN BLOOD BY AUTOMATED COUNT: 17.2 % (ref 11.6–15.1)
GFR SERPL CREATININE-BSD FRML MDRD: 70 ML/MIN/1.73SQ M
GLUCOSE SERPL-MCNC: 90 MG/DL (ref 65–140)
HCT VFR BLD AUTO: 33.9 % (ref 34.8–46.1)
HGB BLD-MCNC: 10.9 G/DL (ref 11.5–15.4)
IMM GRANULOCYTES # BLD AUTO: 0.09 THOUSAND/UL (ref 0–0.2)
IMM GRANULOCYTES NFR BLD AUTO: 1 % (ref 0–2)
LYMPHOCYTES # BLD AUTO: 1.62 THOUSANDS/ΜL (ref 0.6–4.47)
LYMPHOCYTES NFR BLD AUTO: 12 % (ref 14–44)
MCH RBC QN AUTO: 28.8 PG (ref 26.8–34.3)
MCHC RBC AUTO-ENTMCNC: 32.2 G/DL (ref 31.4–37.4)
MCV RBC AUTO: 89 FL (ref 82–98)
MONOCYTES # BLD AUTO: 1.04 THOUSAND/ΜL (ref 0.17–1.22)
MONOCYTES NFR BLD AUTO: 8 % (ref 4–12)
NEUTROPHILS # BLD AUTO: 10.3 THOUSANDS/ΜL (ref 1.85–7.62)
NEUTS SEG NFR BLD AUTO: 76 % (ref 43–75)
NRBC BLD AUTO-RTO: 0 /100 WBCS
PLATELET # BLD AUTO: 370 THOUSANDS/UL (ref 149–390)
PMV BLD AUTO: 10.9 FL (ref 8.9–12.7)
POTASSIUM SERPL-SCNC: 3.1 MMOL/L (ref 3.5–5.3)
PROT SERPL-MCNC: 6.8 G/DL (ref 6.4–8.2)
RBC # BLD AUTO: 3.79 MILLION/UL (ref 3.81–5.12)
SODIUM SERPL-SCNC: 136 MMOL/L (ref 136–145)
WBC # BLD AUTO: 13.43 THOUSAND/UL (ref 4.31–10.16)

## 2021-12-20 PROCEDURE — NC001 PR NO CHARGE: Performed by: INTERNAL MEDICINE

## 2021-12-20 PROCEDURE — 99233 SBSQ HOSP IP/OBS HIGH 50: CPT | Performed by: FAMILY MEDICINE

## 2021-12-20 PROCEDURE — 99232 SBSQ HOSP IP/OBS MODERATE 35: CPT | Performed by: INTERNAL MEDICINE

## 2021-12-20 PROCEDURE — 85025 COMPLETE CBC W/AUTO DIFF WBC: CPT | Performed by: HOSPITALIST

## 2021-12-20 PROCEDURE — 80053 COMPREHEN METABOLIC PANEL: CPT | Performed by: HOSPITALIST

## 2021-12-20 RX ORDER — POTASSIUM CHLORIDE 20 MEQ/1
40 TABLET, EXTENDED RELEASE ORAL ONCE
Status: COMPLETED | OUTPATIENT
Start: 2021-12-20 | End: 2021-12-20

## 2021-12-20 RX ADMIN — ENOXAPARIN SODIUM 40 MG: 40 INJECTION SUBCUTANEOUS at 14:26

## 2021-12-20 RX ADMIN — METOPROLOL TARTRATE 25 MG: 25 TABLET, FILM COATED ORAL at 17:49

## 2021-12-20 RX ADMIN — METOPROLOL TARTRATE 25 MG: 25 TABLET, FILM COATED ORAL at 09:14

## 2021-12-20 RX ADMIN — METRONIDAZOLE 500 MG: 500 TABLET ORAL at 14:26

## 2021-12-20 RX ADMIN — MELATONIN 4.5 MG: at 22:07

## 2021-12-20 RX ADMIN — HYDROCHLOROTHIAZIDE 12.5 MG: 12.5 TABLET ORAL at 09:14

## 2021-12-20 RX ADMIN — ATORVASTATIN CALCIUM 40 MG: 40 TABLET, FILM COATED ORAL at 17:49

## 2021-12-20 RX ADMIN — METRONIDAZOLE 500 MG: 500 TABLET ORAL at 05:08

## 2021-12-20 RX ADMIN — CEFTRIAXONE SODIUM 1000 MG: 10 INJECTION, POWDER, FOR SOLUTION INTRAVENOUS at 00:45

## 2021-12-20 RX ADMIN — ESCITALOPRAM OXALATE 5 MG: 10 TABLET ORAL at 22:07

## 2021-12-20 RX ADMIN — METRONIDAZOLE 500 MG: 500 TABLET ORAL at 22:07

## 2021-12-20 RX ADMIN — POTASSIUM CHLORIDE 40 MEQ: 1500 TABLET, EXTENDED RELEASE ORAL at 09:15

## 2021-12-20 NOTE — PROGRESS NOTES
Progress Note - Jessica Copeland Elzbieta 80 y o  female MRN: [id number]    Unit/Bed#: E5 -01 Encounter: [id number]    Subjective:     Patient seen and examined at bedside   [id number] was used to communicate  She is feeling better  She denies abdominal pain, nausea, vomiting  Objective:     Vitals: Blood pressure 146/75, pulse 98, temperature (!) 97 4 °F (36 3 °C), resp  rate 18, SpO2 93 %  ,There is no height or weight on file to calculate BMI  No intake or output data in the 24 hours ending 12/20/21 1204    Physical Exam:     General Appearance: Alert, elderly female, appears stated age and cooperative  Lungs: Clear to auscultation bilaterally  Heart: Regular rate and rhythm  Abdomen: Soft, non-tender, non-distended; bowel sounds normal  Extremities: No cyanosis, edema    Invasive Devices  Report    Peripheral Intravenous Line            Peripheral IV 12/19/21 Left;Ventral (anterior) Forearm <1 day                Lab Results:  Results from last 7 days   Lab Units 12/20/21  0437   WBC Thousand/uL 13 43*   HEMOGLOBIN g/dL 10 9*   HEMATOCRIT % 33 9*   PLATELETS Thousands/uL 370   NEUTROS PCT % 76*   LYMPHS PCT % 12*   MONOS PCT % 8   EOS PCT % 2     Results from last 7 days   Lab Units 12/20/21  0437   POTASSIUM mmol/L 3 1*   CHLORIDE mmol/L 98*   CO2 mmol/L 30   BUN mg/dL 27*   CREATININE mg/dL 0 76   CALCIUM mg/dL 9 5   ALK PHOS U/L 758*   ALT U/L 61   AST U/L 90*               Imaging Studies: I have personally reviewed pertinent imaging studies  XR chest 2 views    Result Date: 12/15/2021  Impression: No acute cardiopulmonary disease  Workstation performed: [id number]     CT abdomen pelvis with contrast    Result Date: 12/14/2021  Impression: 1  Lobulated low-attenuation soft tissue mass centrally within the liver with additional smaller satellite lesions  No definite primary site of tumor is identified    The enhancement pattern and presence of intrahepatic biliary ductal dilatation is nonspecific but might suggest cholangiocarcinoma as a possibility  2   Intraluminal curvilinear radiopaque foreign body at the rectosigmoid junction of uncertain etiology  No evidence of perforation  3   Multiple colonic diverticula, with infiltration of the fat in the sigmoid mesentery suggestive of diverticulitis  No colon mass is identified  No evidence of perforation or abscess  Workstation performed: [id number]       Assessment and Plan:    Discussed plan with SLIM    55-year-old female with atrial fibrillation, CVA on Eliquis, hypertension admitted with jaundice found to have large liver lesion  1) Jaundice, liver lesion: Her bilirubin is elevated to 2 70 and there is a lobulated mass measuring 6 1 x 4 1 cm with smaller satellite lesions in the liver with intrahepatic biliary ductal dilatation concerning for possible cholangiocarcinoma  CEA and CA-19-9 significantly elevated 289 and 11,997 respectively      -SLIM contacted [employer] to interrogate her pacemaker to see if it is MRI compatible  -If so, plan for MRI to further characterize the liver lesion  -I did enter an order for IR image guided biopsy of the liver as well  -Monitor liver enzymes

## 2021-12-20 NOTE — ASSESSMENT & PLAN NOTE
Found to have a concerning liver or bile duct mass    Concerning for either liver cancer or cholangiocarcinoma    Appreciate GI input    For MRI/MRCP today  I discussed with MRI  [employer] pacemaker is MRI compatible  Previously had MRI of the brain 11/2021 with apparently pacemaker already in place - dating 1/23/2020 at [address]

## 2021-12-20 NOTE — PLAN OF CARE
Problem: Potential for Falls  Goal: Patient will remain free of falls  Description: INTERVENTIONS:  - Educate patient/family on patient safety including physical limitations  - Instruct patient to call for assistance with activity   - Consult OT/PT to assist with strengthening/mobility   - Keep Call bell within reach  - Keep bed low and locked with side rails adjusted as appropriate  - Keep care items and personal belongings within reach  - Initiate and maintain comfort rounds  - Make Fall Risk Sign visible to staff  - Apply yellow socks and bracelet for high fall risk patients  - Consider moving patient to room near nurses station  Outcome: Progressing     Problem: MOBILITY - ADULT  Goal: Maintain or return to baseline ADL function  Description: INTERVENTIONS:  -  Assess patient's ability to carry out ADLs; assess patient's baseline for ADL function and identify physical deficits which impact ability to perform ADLs (bathing, care of mouth/teeth, toileting, grooming, dressing, etc )  - Assess/evaluate cause of self-care deficits   - Assess range of motion  - Assess patient's mobility; develop plan if impaired  - Assess patient's need for assistive devices and provide as appropriate  - Encourage maximum independence but intervene and supervise when necessary  - Involve family in performance of ADLs  - Assess for home care needs following discharge   - Consider OT consult to assist with ADL evaluation and planning for discharge  - Provide patient education as appropriate  Outcome: Progressing  Goal: Maintains/Returns to pre admission functional level  Description: INTERVENTIONS:  - Perform BMAT or MOVE assessment daily    - Set and communicate daily mobility goal to care team and patient/family/caregiver     - Collaborate with rehabilitation services on mobility goals if consulted  - Out of bed for toileting  - Record patient progress and toleration of activity level   Outcome: Progressing     Problem: Prexisting or High Potential for Compromised Skin Integrity  Goal: Skin integrity is maintained or improved  Description: INTERVENTIONS:  - Identify patients at risk for skin breakdown  - Assess and monitor skin integrity  - Assess and monitor nutrition and hydration status  - Monitor labs   - Assess for incontinence   - Turn and reposition patient  - Assist with mobility/ambulation  - Relieve pressure over bony prominences  - Avoid friction and shearing  - Provide appropriate hygiene as needed including keeping skin clean and dry  - Evaluate need for skin moisturizer/barrier cream  - Collaborate with interdisciplinary team   - Patient/family teaching  - Consider wound care consult   Outcome: Progressing     Problem: Nutrition/Hydration-ADULT  Goal: Nutrient/Hydration intake appropriate for improving, restoring or maintaining nutritional needs  Description: Monitor and assess patient's nutrition/hydration status for malnutrition  Collaborate with interdisciplinary team and initiate plan and interventions as ordered  Monitor patient's weight and dietary intake as ordered or per policy  Utilize nutrition screening tool and intervene as necessary  Determine patient's food preferences and provide high-protein, high-caloric foods as appropriate       INTERVENTIONS:  - Monitor oral intake, urinary output, labs, and treatment plans  - Assess nutrition and hydration status and recommend course of action  - Evaluate amount of meals eaten  - Assist patient with eating if necessary   - Allow adequate time for meals  - Recommend/ encourage appropriate diets, oral nutritional supplements, and vitamin/mineral supplements  - Order, calculate, and assess calorie counts as needed  - Recommend, monitor, and adjust tube feedings and TPN/PPN based on assessed needs  - Assess need for intravenous fluids  - Provide specific nutrition/hydration education as appropriate  - Include patient/family/caregiver in decisions related to nutrition  Outcome: Progressing

## 2021-12-20 NOTE — ASSESSMENT & PLAN NOTE
Per CT abd/pelvis, "Lobulated low-attenuation soft tissue mass centrally within the liver with additional smaller satellite lesions   No definite primary site of tumor is identified   The enhancement pattern and presence of intrahepatic biliary ductal dilatation is nonspecific but might suggest cholangiocarcinoma as a possibility "  - GI input appreciated  - For MRCP today, compatibility with [employer] confirmed   - I discussed with patient's son Alfredo Quintanilla who said family would be interested in pursuing biopsy if recommended for diagnostics

## 2021-12-20 NOTE — ASSESSMENT & PLAN NOTE
· CT scan revealed multiple colonic diverticula with infiltration of fat and sigmoid mesentery suggestive of diverticulitis, no evidence of perforation or abscess  · Symptoms appear to have resolved   · Continue with ceftriaxone and metronidazole

## 2021-12-20 NOTE — PROGRESS NOTES
[address] [facility]  Progress Note Eliza Vincent 1933, 80 y o  female MRN: [id number]  Unit/Bed#: E5 -01 Encounter: [id number]  Primary Care Provider: Lynette Douglas DO   Date and time admitted to hospital: 12/14/2021  1:10 PM    * Liver mass  Assessment & Plan  Per CT abd/pelvis, "Lobulated low-attenuation soft tissue mass centrally within the liver with additional smaller satellite lesions   No definite primary site of tumor is identified   The enhancement pattern and presence of intrahepatic biliary ductal dilatation is nonspecific but might suggest cholangiocarcinoma as a possibility "  - GI input appreciated  - For MRCP today, compatibility with [employer] confirmed   - I discussed with patient's son Yolanda Meyer who said family would be interested in pursuing biopsy if recommended for diagnostics      Jaundice  Assessment & Plan  Found to have a concerning liver or bile duct mass    Concerning for either liver cancer or cholangiocarcinoma    Appreciate GI input    For MRI/MRCP today  I discussed with MRI  [address] pacemaker is MRI compatible  Previously had MRI of the brain 11/2021 with apparently pacemaker already in place - dating 1/23/2020 at [address]  Diverticulitis  Assessment & Plan  · CT scan revealed multiple colonic diverticula with infiltration of fat and sigmoid mesentery suggestive of diverticulitis, no evidence of perforation or abscess  · Symptoms appear to have resolved   · Continue with ceftriaxone and metronidazole      History of CVA (cerebrovascular accident)  Assessment & Plan  · Recent right MCA infarct at [facility] 11/2021  · No residual deficits  · Maintained on aspirin, Eliquis, statin as an outpatient    Holding eliquis as she may need ERCP with stenting    Essential hypertension  Assessment & Plan  · Continue metoprolol 25 mg b i d   And hydrochlorothiazide 12 5 mg daily    Atrial fibrillation (HCC)  Assessment & Plan  · Maintained on metoprolol for rate control      Holding anticoagulation for possible upcoming procedures specifically potential biopsy of the liver mass - tbd         VTE Pharmacologic Prophylaxis: VTE Score: 10 High Risk (Score >/= 5) - Pharmacological DVT Prophylaxis Ordered: enoxaparin (Lovenox)  Sequential Compression Devices Ordered  Eliquis on hold for possible liver biopsy     Patient Centered Rounds: I performed bedside rounds with nursing staff today  Discussions with Specialists or Other Care Team Provider: GI    Education and Discussions with Family / Patient: Updated  (son) via phone  Time Spent for Care: 45 minutes  More than 50% of total time spent on counseling and coordination of care as described above  Current Length of Stay: 6 day(s)  Current Patient Status: Inpatient   Certification Statement: The patient will continue to require additional inpatient hospital stay due to need for diagnostic workup   Discharge Plan: Anticipate discharge in 48 hrs to home with home services  Code Status: Level 1 - Full Code    Subjective:   Patient seen and examined  She reports feeling well  She denies any pain, nausea or vomiting  Objective:     Vitals:   Temp (24hrs), Av 6 °F (36 4 °C), Min:97 4 °F (36 3 °C), Max:97 7 °F (36 5 °C)    Temp:  [97 4 °F (36 3 °C)-97 7 °F (36 5 °C)] 97 4 °F (36 3 °C)  HR:  [80-98] 98  Resp:  [18] 18  BP: (132-148)/(64-85) 146/75  SpO2:  [91 %-96 %] 93 %  There is no height or weight on file to calculate BMI  Input and Output Summary (last 24 hours):   No intake or output data in the 24 hours ending 21 1240    Physical Exam:   Physical Exam  Constitutional:       General: She is not in acute distress  HENT:      Head: Normocephalic and atraumatic  Nose: No congestion  Mouth/Throat:      Pharynx: Oropharynx is clear  Eyes:      Conjunctiva/sclera: Conjunctivae normal    Cardiovascular:      Rate and Rhythm: Normal rate and regular rhythm  Heart sounds: No murmur heard  Pulmonary:      Effort: No respiratory distress  Breath sounds: No wheezing or rales  Abdominal:      General: Bowel sounds are normal  There is no distension  Palpations: Abdomen is soft  Tenderness: There is no abdominal tenderness  There is no guarding  Musculoskeletal:      Right lower leg: No edema  Left lower leg: No edema  Skin:     General: Skin is warm and dry  Neurological:      Mental Status: Mental status is at baseline  Psychiatric:         Mood and Affect: Mood normal           Additional Data:     Labs:  Results from last 7 days   Lab Units 12/20/21  0437   WBC Thousand/uL 13 43*   HEMOGLOBIN g/dL 10 9*   HEMATOCRIT % 33 9*   PLATELETS Thousands/uL 370   NEUTROS PCT % 76*   LYMPHS PCT % 12*   MONOS PCT % 8   EOS PCT % 2     Results from last 7 days   Lab Units 12/20/21  0437   SODIUM mmol/L 136   POTASSIUM mmol/L 3 1*   CHLORIDE mmol/L 98*   CO2 mmol/L 30   BUN mg/dL 27*   CREATININE mg/dL 0 76   ANION GAP mmol/L 8   CALCIUM mg/dL 9 5   ALBUMIN g/dL 2 1*   TOTAL BILIRUBIN mg/dL 2 70*   ALK PHOS U/L 758*   ALT U/L 61   AST U/L 90*   GLUCOSE RANDOM mg/dL 90                       Lines/Drains:  Invasive Devices  Report    Peripheral Intravenous Line            Peripheral IV 12/19/21 Left;Ventral (anterior) Forearm <1 day                      Imaging:  Will review imaging/report of MRI abdomen/MRCP    Recent Cultures (last 7 days):         Last 24 Hours Medication List:   Current Facility-Administered Medications   Medication Dose Route Frequency Provider Last Rate    aspirin  81 mg Oral Daily Rachid Schumacher MD      atorvastatin  40 mg Oral QPM Rachid Schumacher MD      cefTRIAXone  1,000 mg Intravenous Q24H Rachid Schumacher MD Stopped (12/20/21 0150)    escitalopram  5 mg Oral HS Rachid Schumacher MD      hydrochlorothiazide  12 5 mg Oral Daily Rachid Schumacher MD      melatonin  4 5 mg Oral HS Rachid Schumacher MD      metoprolol tartrate  25 mg Oral BID Jenaro Conner MD      metroNIDAZOLE  500 mg Oral [facility] Jenaro Conner MD      ondansetron  4 mg Intravenous Q6H PRN Jenaro Conner MD          Today, Patient Was Seen By: Edson Leiva MD    **Please Note: This note may have been constructed using a voice recognition system  **

## 2021-12-20 NOTE — ASSESSMENT & PLAN NOTE
· Maintained on metoprolol for rate control      Holding anticoagulation for possible upcoming procedures specifically potential biopsy of the liver mass - tbd

## 2021-12-20 NOTE — TELEMEDICINE
e-Consult (IPC)  - Interventional Radiology  Jf Ruff 80 y o  female MRN: [id number]  Unit/Bed#: E5 -01 Encounter: [id number]    IR has been consulted to evaluate the patient, determine the appropriate procedure, and whether or not a procedure can and should be performed regarding the care of [email or web address]  IP Consult to IR  Consult performed by: Rupesh Ozuna MD  Consult ordered by: Elias Jeffries PA-C        12/20/21    Assessment/Recommendation:     80year old male with a medial left liver lobe lobulated mass, differential considerations include cholangiocarcinoma  MRI pending at time of consult  Plan for image guided percutaneous biopsy, likely US  Total time spent in review of data, discussion with requesting provider and rendering advice was 15 min    Thank you for allowing Interventional Radiology to participate in the care of [email or web address]  Please don't hesitate to call or TigerText us with any questions       Rupesh Ozuna MD

## 2021-12-20 NOTE — ASSESSMENT & PLAN NOTE
· Recent right MCA infarct at [facility] 11/2021  · No residual deficits  · Maintained on aspirin, Eliquis, statin as an outpatient    Holding eliquis as she may need ERCP with stenting

## 2021-12-21 ENCOUNTER — APPOINTMENT (INPATIENT)
Dept: MRI IMAGING | Facility: HOSPITAL | Age: 86
DRG: 436 | End: 2021-12-21
Payer: COMMERCIAL

## 2021-12-21 ENCOUNTER — APPOINTMENT (INPATIENT)
Dept: RADIOLOGY | Facility: HOSPITAL | Age: 86
DRG: 436 | End: 2021-12-21
Attending: INTERNAL MEDICINE
Payer: COMMERCIAL

## 2021-12-21 LAB
ALBUMIN SERPL BCP-MCNC: 2.1 G/DL (ref 3.5–5)
ALP SERPL-CCNC: 835 U/L (ref 46–116)
ALT SERPL W P-5'-P-CCNC: 57 U/L (ref 12–78)
ANION GAP SERPL CALCULATED.3IONS-SCNC: 5 MMOL/L (ref 4–13)
AST SERPL W P-5'-P-CCNC: 104 U/L (ref 5–45)
BASOPHILS # BLD AUTO: 0.07 THOUSANDS/ΜL (ref 0–0.1)
BASOPHILS NFR BLD AUTO: 1 % (ref 0–1)
BILIRUB SERPL-MCNC: 2.6 MG/DL (ref 0.2–1)
BUN SERPL-MCNC: 30 MG/DL (ref 5–25)
CALCIUM ALBUM COR SERPL-MCNC: 10.8 MG/DL (ref 8.3–10.1)
CALCIUM SERPL-MCNC: 9.3 MG/DL (ref 8.3–10.1)
CHLORIDE SERPL-SCNC: 100 MMOL/L (ref 100–108)
CO2 SERPL-SCNC: 30 MMOL/L (ref 21–32)
CREAT SERPL-MCNC: 0.77 MG/DL (ref 0.6–1.3)
EOSINOPHIL # BLD AUTO: 0.29 THOUSAND/ΜL (ref 0–0.61)
EOSINOPHIL NFR BLD AUTO: 2 % (ref 0–6)
ERYTHROCYTE [DISTWIDTH] IN BLOOD BY AUTOMATED COUNT: 17.4 % (ref 11.6–15.1)
GFR SERPL CREATININE-BSD FRML MDRD: 69 ML/MIN/1.73SQ M
GLUCOSE SERPL-MCNC: 107 MG/DL (ref 65–140)
HCT VFR BLD AUTO: 35.1 % (ref 34.8–46.1)
HGB BLD-MCNC: 11.3 G/DL (ref 11.5–15.4)
IMM GRANULOCYTES # BLD AUTO: 0.06 THOUSAND/UL (ref 0–0.2)
IMM GRANULOCYTES NFR BLD AUTO: 0 % (ref 0–2)
INR PPP: 1.47 (ref 0.84–1.19)
LYMPHOCYTES # BLD AUTO: 1.37 THOUSANDS/ΜL (ref 0.6–4.47)
LYMPHOCYTES NFR BLD AUTO: 10 % (ref 14–44)
MAGNESIUM SERPL-MCNC: 2.2 MG/DL (ref 1.6–2.6)
MCH RBC QN AUTO: 28.1 PG (ref 26.8–34.3)
MCHC RBC AUTO-ENTMCNC: 32.2 G/DL (ref 31.4–37.4)
MCV RBC AUTO: 87 FL (ref 82–98)
MONOCYTES # BLD AUTO: 1.02 THOUSAND/ΜL (ref 0.17–1.22)
MONOCYTES NFR BLD AUTO: 7 % (ref 4–12)
NEUTROPHILS # BLD AUTO: 10.92 THOUSANDS/ΜL (ref 1.85–7.62)
NEUTS SEG NFR BLD AUTO: 80 % (ref 43–75)
NRBC BLD AUTO-RTO: 0 /100 WBCS
PLATELET # BLD AUTO: 395 THOUSANDS/UL (ref 149–390)
PMV BLD AUTO: 10.9 FL (ref 8.9–12.7)
POTASSIUM SERPL-SCNC: 3.8 MMOL/L (ref 3.5–5.3)
PROT SERPL-MCNC: 6.9 G/DL (ref 6.4–8.2)
PROTHROMBIN TIME: 17.3 SECONDS (ref 11.6–14.5)
RBC # BLD AUTO: 4.02 MILLION/UL (ref 3.81–5.12)
SODIUM SERPL-SCNC: 135 MMOL/L (ref 136–145)
WBC # BLD AUTO: 13.73 THOUSAND/UL (ref 4.31–10.16)

## 2021-12-21 PROCEDURE — 47000 NEEDLE BIOPSY OF LIVER PERQ: CPT

## 2021-12-21 PROCEDURE — G1004 CDSM NDSC: HCPCS

## 2021-12-21 PROCEDURE — 76942 ECHO GUIDE FOR BIOPSY: CPT

## 2021-12-21 PROCEDURE — 88307 TISSUE EXAM BY PATHOLOGIST: CPT | Performed by: PATHOLOGY

## 2021-12-21 PROCEDURE — 88342 IMHCHEM/IMCYTCHM 1ST ANTB: CPT | Performed by: PATHOLOGY

## 2021-12-21 PROCEDURE — 47000 NEEDLE BIOPSY OF LIVER PERQ: CPT | Performed by: RADIOLOGY

## 2021-12-21 PROCEDURE — 85025 COMPLETE CBC W/AUTO DIFF WBC: CPT | Performed by: FAMILY MEDICINE

## 2021-12-21 PROCEDURE — 88341 IMHCHEM/IMCYTCHM EA ADD ANTB: CPT | Performed by: PATHOLOGY

## 2021-12-21 PROCEDURE — 99232 SBSQ HOSP IP/OBS MODERATE 35: CPT | Performed by: FAMILY MEDICINE

## 2021-12-21 PROCEDURE — 0FB03ZX EXCISION OF LIVER, PERCUTANEOUS APPROACH, DIAGNOSTIC: ICD-10-PCS | Performed by: RADIOLOGY

## 2021-12-21 PROCEDURE — 85610 PROTHROMBIN TIME: CPT | Performed by: FAMILY MEDICINE

## 2021-12-21 PROCEDURE — 74183 MRI ABD W/O CNTR FLWD CNTR: CPT

## 2021-12-21 PROCEDURE — 76942 ECHO GUIDE FOR BIOPSY: CPT | Performed by: RADIOLOGY

## 2021-12-21 PROCEDURE — 99153 MOD SED SAME PHYS/QHP EA: CPT

## 2021-12-21 PROCEDURE — 80053 COMPREHEN METABOLIC PANEL: CPT | Performed by: FAMILY MEDICINE

## 2021-12-21 PROCEDURE — 83735 ASSAY OF MAGNESIUM: CPT | Performed by: FAMILY MEDICINE

## 2021-12-21 PROCEDURE — 99152 MOD SED SAME PHYS/QHP 5/>YRS: CPT

## 2021-12-21 PROCEDURE — A9585 GADOBUTROL INJECTION: HCPCS | Performed by: INTERNAL MEDICINE

## 2021-12-21 PROCEDURE — 99152 MOD SED SAME PHYS/QHP 5/>YRS: CPT | Performed by: RADIOLOGY

## 2021-12-21 RX ORDER — MIDAZOLAM HYDROCHLORIDE 2 MG/2ML
INJECTION, SOLUTION INTRAMUSCULAR; INTRAVENOUS CODE/TRAUMA/SEDATION MEDICATION
Status: COMPLETED | OUTPATIENT
Start: 2021-12-21 | End: 2021-12-21

## 2021-12-21 RX ORDER — LIDOCAINE WITH 8.4% SOD BICARB 0.9%(10ML)
SYRINGE (ML) INJECTION CODE/TRAUMA/SEDATION MEDICATION
Status: COMPLETED | OUTPATIENT
Start: 2021-12-21 | End: 2021-12-21

## 2021-12-21 RX ORDER — FENTANYL CITRATE 50 UG/ML
INJECTION, SOLUTION INTRAMUSCULAR; INTRAVENOUS CODE/TRAUMA/SEDATION MEDICATION
Status: COMPLETED | OUTPATIENT
Start: 2021-12-21 | End: 2021-12-21

## 2021-12-21 RX ADMIN — METOPROLOL TARTRATE 25 MG: 25 TABLET, FILM COATED ORAL at 17:32

## 2021-12-21 RX ADMIN — HYDROCHLOROTHIAZIDE 12.5 MG: 12.5 TABLET ORAL at 08:49

## 2021-12-21 RX ADMIN — FENTANYL CITRATE 25 MCG: 50 INJECTION INTRAMUSCULAR; INTRAVENOUS at 15:00

## 2021-12-21 RX ADMIN — Medication 10 ML: at 15:08

## 2021-12-21 RX ADMIN — GADOBUTROL 7 ML: 604.72 INJECTION INTRAVENOUS at 13:53

## 2021-12-21 RX ADMIN — CEFTRIAXONE SODIUM 1000 MG: 10 INJECTION, POWDER, FOR SOLUTION INTRAVENOUS at 01:35

## 2021-12-21 RX ADMIN — MELATONIN 4.5 MG: at 22:15

## 2021-12-21 RX ADMIN — METRONIDAZOLE 500 MG: 500 TABLET ORAL at 05:59

## 2021-12-21 RX ADMIN — ESCITALOPRAM OXALATE 5 MG: 10 TABLET ORAL at 22:15

## 2021-12-21 RX ADMIN — METOPROLOL TARTRATE 25 MG: 25 TABLET, FILM COATED ORAL at 08:49

## 2021-12-21 RX ADMIN — ATORVASTATIN CALCIUM 40 MG: 40 TABLET, FILM COATED ORAL at 17:32

## 2021-12-21 RX ADMIN — METRONIDAZOLE 500 MG: 500 TABLET ORAL at 22:15

## 2021-12-21 RX ADMIN — MIDAZOLAM 0.5 MG: 1 INJECTION INTRAMUSCULAR; INTRAVENOUS at 14:59

## 2021-12-21 NOTE — ASSESSMENT & PLAN NOTE
Per CT abd/pelvis, "Lobulated low-attenuation soft tissue mass centrally within the liver with additional smaller satellite lesions   No definite primary site of tumor is identified   The enhancement pattern and presence of intrahepatic biliary ductal dilatation is nonspecific but might suggest cholangiocarcinoma as a possibility "  - GI input appreciated  - S/p MRCP, compatibility with [facility] scientific confirmed, per report,   "Dominant mass centered in the left hepatic lobe with additional satellite lesions and moderate intrahepatic biliary ductal dilatation  Trina Gato is the leading diagnostic consideration   Mildly enlarged as well as prominent though nonenlarged right upper quadrant lymph nodes may be reactive or metastatic"  - I discussed with patient's son Sundar Ames who said family is interested in pursuing biopsy if recommended for diagnostics  - patient is status post IR liver biopsy, tolerated procedure well  - I explained to the son high suspicion for cancer and family would unlikely be seeking treatment of malignant etiologies confirmed

## 2021-12-21 NOTE — PLAN OF CARE
Problem: Potential for Falls  Goal: Patient will remain free of falls  Description: INTERVENTIONS:  - Educate patient/family on patient safety including physical limitations  - Instruct patient to call for assistance with activity   - Consult OT/PT to assist with strengthening/mobility   - Keep Call bell within reach  - Keep bed low and locked with side rails adjusted as appropriate  - Keep care items and personal belongings within reach  - Initiate and maintain comfort rounds  - Make Fall Risk Sign visible to staff  -- Apply yellow socks and bracelet for high fall risk patients  - Consider moving patient to room near nurses station  Outcome: Progressing     Problem: MOBILITY - ADULT  Goal: Maintain or return to baseline ADL function  Description: INTERVENTIONS:  -  Assess patient's ability to carry out ADLs; assess patient's baseline for ADL function and identify physical deficits which impact ability to perform ADLs (bathing, care of mouth/teeth, toileting, grooming, dressing, etc )  - Assess/evaluate cause of self-care deficits   - Assess range of motion  - Assess patient's mobility; develop plan if impaired  - Assess patient's need for assistive devices and provide as appropriate  - Encourage maximum independence but intervene and supervise when necessary  - Involve family in performance of ADLs  - Assess for home care needs following discharge   - Consider OT consult to assist with ADL evaluation and planning for discharge  - Provide patient education as appropriate  Outcome: Progressing  Goal: Maintains/Returns to pre admission functional level  Description: INTERVENTIONS:  - Perform BMAT or MOVE assessment daily    - Set and communicate daily mobility goal to care team and patient/family/caregiver     - Collaborate with rehabilitation services on mobility goals if consulted  -- Out of bed for toileting  - Record patient progress and toleration of activity level   Outcome: Progressing     Problem: Prexisting or High Potential for Compromised Skin Integrity  Goal: Skin integrity is maintained or improved  Description: INTERVENTIONS:  - Identify patients at risk for skin breakdown  - Assess and monitor skin integrity  - Assess and monitor nutrition and hydration status  - Monitor labs   - Assess for incontinence   - Turn and reposition patient  - Assist with mobility/ambulation  - Relieve pressure over bony prominences  - Avoid friction and shearing  - Provide appropriate hygiene as needed including keeping skin clean and dry  - Evaluate need for skin moisturizer/barrier cream  - Collaborate with interdisciplinary team   - Patient/family teaching  - Consider wound care consult   Outcome: Progressing     Problem: Nutrition/Hydration-ADULT  Goal: Nutrient/Hydration intake appropriate for improving, restoring or maintaining nutritional needs  Description: Monitor and assess patient's nutrition/hydration status for malnutrition  Collaborate with interdisciplinary team and initiate plan and interventions as ordered  Monitor patient's weight and dietary intake as ordered or per policy  Utilize nutrition screening tool and intervene as necessary  Determine patient's food preferences and provide high-protein, high-caloric foods as appropriate       INTERVENTIONS:  - Monitor oral intake, urinary output, labs, and treatment plans  - Assess nutrition and hydration status and recommend course of action  - Evaluate amount of meals eaten  - Assist patient with eating if necessary   - Allow adequate time for meals  - Recommend/ encourage appropriate diets, oral nutritional supplements, and vitamin/mineral supplements  - Order, calculate, and assess calorie counts as needed  - Recommend, monitor, and adjust tube feedings and TPN/PPN based on assessed needs  - Assess need for intravenous fluids  - Provide specific nutrition/hydration education as appropriate  - Include patient/family/caregiver in decisions related to nutrition  Outcome: Progressing

## 2021-12-21 NOTE — QUICK NOTE
IR quick note    Mri delayed to today    Biopsy planned this AM - however will defer as biopsy can alter MRI by introducing air and blood products    patient will also require bedrest after biopsy and this is best not done in MRI machine for safety reasons    Will re eval this PM to see if IR can accommodate, otherwise will defer until later this week if still indicated after MRI    Maintain NPO for now

## 2021-12-21 NOTE — ASSESSMENT & PLAN NOTE
· Maintained on metoprolol for rate control    Anticoagulation on hold status post IR liver biopsy, will continue to hold with possibility of ERCP

## 2021-12-21 NOTE — INTERVAL H&P NOTE
Update: (This section must be completed if the H&P was completed greater than 24 hrs to procedure or admission)    H&P reviewed  After examining the patient, I find no changed to the H&P since it had been written  88F with liver lesions, unclear etiology, MRI performed earlier today    Biopsy indicated for diagnosis    Risks benefits alternatives discussed with the patient bleeding risk of bleeding liver damage, all anticoagulation has been held  We will sedate   utilized  Patient re-evaluated   Accept as history and physical     Tracy Donahue MD/December 21, 2021/2:56 PM

## 2021-12-21 NOTE — ASSESSMENT & PLAN NOTE
· Recent right MCA infarct at [facility] 11/2021  · No residual deficits  · Maintained on aspirin, Eliquis, statin as an outpatient    Holding Eliquis for procedures - s/p liver biopsy 12/21, potential plan for ERCP with stenting

## 2021-12-22 VITALS
SYSTOLIC BLOOD PRESSURE: 131 MMHG | RESPIRATION RATE: 18 BRPM | TEMPERATURE: 97.7 F | OXYGEN SATURATION: 95 % | HEART RATE: 87 BPM | DIASTOLIC BLOOD PRESSURE: 73 MMHG

## 2021-12-22 PROCEDURE — 99239 HOSP IP/OBS DSCHRG MGMT >30: CPT | Performed by: FAMILY MEDICINE

## 2021-12-22 RX ADMIN — CEFTRIAXONE SODIUM 1000 MG: 10 INJECTION, POWDER, FOR SOLUTION INTRAVENOUS at 01:42

## 2021-12-22 RX ADMIN — ASPIRIN 81 MG: 81 TABLET, COATED ORAL at 10:18

## 2021-12-22 RX ADMIN — METOPROLOL TARTRATE 25 MG: 25 TABLET, FILM COATED ORAL at 10:19

## 2021-12-22 RX ADMIN — ENOXAPARIN SODIUM 40 MG: 40 INJECTION SUBCUTANEOUS at 10:19

## 2021-12-22 RX ADMIN — METRONIDAZOLE 500 MG: 500 TABLET ORAL at 06:14

## 2021-12-22 RX ADMIN — HYDROCHLOROTHIAZIDE 12.5 MG: 12.5 TABLET ORAL at 10:18

## 2021-12-22 NOTE — DISCHARGE SUMMARY
[address] [facility]  Discharge- Kiana Pratt 1933, 80 y o  female MRN: [id number]  Unit/Bed#: E5 -01 Encounter: [id number]  Primary Care Provider: Nida Stevens DO   Date and time admitted to hospital: 12/14/2021  1:10 PM    * Liver mass  Assessment & Plan  Per CT abd/pelvis, "Lobulated low-attenuation soft tissue mass centrally within the liver with additional smaller satellite lesions   No definite primary site of tumor is identified   The enhancement pattern and presence of intrahepatic biliary ductal dilatation is nonspecific but might suggest cholangiocarcinoma as a possibility "  - GI input appreciated  - S/p MRCP, compatibility with [facility] scientific confirmed, per report,   "Dominant mass centered in the left hepatic lobe with additional satellite lesions and moderate intrahepatic biliary ductal dilatation  Odalys Samaniego is the leading diagnostic consideration   Mildly enlarged as well as prominent though nonenlarged right upper quadrant lymph nodes may be reactive or metastatic"  - I discussed with patient's son Helen King who said family is interested in pursuing biopsy if recommended for diagnostics  - patient is status post IR liver biopsy, tolerated procedure well  - I explained to the son high suspicion for cancer and family would unlikely be seeking treatment of malignant etiologies confirmed   - patient will follow-up with GI on outpatient basis to obtain results of the liver biopsy      Diverticulitis  Assessment & Plan  · CT scan revealed multiple colonic diverticula with infiltration of fat and sigmoid mesentery suggestive of diverticulitis, no evidence of perforation or abscess  · Symptoms appear to have resolved   · completed course of antibiotics    History of CVA (cerebrovascular accident)  Assessment & Plan  · Recent right MCA infarct at [facility] 11/2021  · No residual deficits  · Maintained on aspirin, Eliquis, statin as an outpatient  · Continue home medications on discharge    Essential hypertension  Assessment & Plan  · Continue metoprolol 25 mg b i d  And hydrochlorothiazide 12 5 mg daily    Atrial fibrillation (HCC)  Assessment & Plan  · Maintained on metoprolol for rate control  · Eliquis for anticoagulation is resumed on discharge        Medical Problems             Resolved Problems  Date Reviewed: 12/22/2021    None              Discharging Physician / Practitioner: Edson Leiva MD  PCP: Carmen Cruz DO  Admission Date:   Admission Orders (From admission, onward)     Ordered        12/14/21 1726  Inpatient Admission  Once                      Discharge Date: 12/22/21    Consultations During Hospital Stay:  GI, IR    Procedures Performed:   IR biopsy liver mass   Final Result by Laura Mayo MD (12/21 3993)   Impression:       Ultrasound-guided biopsy of a representative hepatic mass            Workstation performed: [id number]         MRI abdomen w wo contrast and mrcp   Final Result by Caitlyn Camp MD (12/21 0021)   Addendum 1 of 1 by Caitlyn Camp MD (12/21 9522)   ADDENDUM:      The liver lesions may alternatively represent metastatic disease  Correlation will be made with the histologic findings  Final      Dominant mass centered in the left hepatic lobe with additional satellite lesions and moderate intrahepatic biliary ductal dilatation  Cholangiocarcinoma is the leading diagnostic consideration  Mildly enlarged as well as prominent though nonenlarged    right upper quadrant lymph nodes may be reactive or metastatic  Workstation performed: [id number]         CT abdomen pelvis with contrast   Final Result by Padmini Krishnamurthy MD (12/14 3693)         1  Lobulated low-attenuation soft tissue mass centrally within the liver with additional smaller satellite lesions  No definite primary site of tumor is identified    The enhancement pattern and presence of intrahepatic biliary ductal dilatation is    nonspecific but might suggest cholangiocarcinoma as a possibility  2   Intraluminal curvilinear radiopaque foreign body at the rectosigmoid junction of uncertain etiology  No evidence of perforation  3   Multiple colonic diverticula, with infiltration of the fat in the sigmoid mesentery suggestive of diverticulitis  No colon mass is identified  No evidence of perforation or abscess  Workstation performed: [id number]         XR chest 2 views   ED Interpretation by Kody Ivey DO (12/14 1561)   Cardiomegaly, otherwise no acute disease      Final Result by Flynn Montanez MD (12/15 1009)      No acute cardiopulmonary disease  Workstation performed: [id number]               Significant Findings / Test Results:   Results from last 7 days   Lab Units 12/21/21  0312   WBC Thousand/uL 13 73*   HEMOGLOBIN g/dL 11 3*   HEMATOCRIT % 35 1   PLATELETS Thousands/uL 395*     Results from last 7 days   Lab Units 12/21/21  0312   SODIUM mmol/L 135*   POTASSIUM mmol/L 3 8   CHLORIDE mmol/L 100   CO2 mmol/L 30   BUN mg/dL 30*   CREATININE mg/dL 0 77   CALCIUM mg/dL 9 3         Incidental Findings:   · None     Test Results Pending at Discharge (will require follow up): · None     Outpatient Tests Requested:  · None    Complications:  None    Reason for Admission: jaundice     Hospital Course:   Mariah Pizarro is a 80 y o  female patient with history of stroke and AFib on anticoagulation who originally presented to the hospital on 12/14/2021 due to jaundice  The patient was found to have liver mass with concern for possible cholangiocarcinoma  The patient underwent workup including IR biopsy which she tolerated well  The patient showed subjective and objective improvement, evident and downward trend in her liver function tests, lack of GI symptoms and tolerance of her diet  The patient was discharged home under the care of her family    She will follow-up with GI regarding results of her biopsy and considerations for next steps and her workup and care  Please see above list of diagnoses and related plan for additional information  Condition at Discharge: good    Discharge Day Visit / Exam:   Subjective:  Patient seen and examined  She reports feeling well and would like to be discharged home  She is tolerating her diet  She denies abdominal pain  Denies nausea vomiting  No overnight events  Vitals: Blood Pressure: 131/73 (12/22/21 0803)  Pulse: 87 (12/22/21 0803)  Temperature: 97 7 °F (36 5 °C) (12/22/21 0803)  Temp Source: Oral (12/20/21 2340)  Respirations: 18 (12/22/21 0803)  SpO2: 95 % (12/22/21 0803)  Exam:   Physical Exam  Constitutional:       General: She is not in acute distress  HENT:      Head: Normocephalic and atraumatic  Nose: No congestion  Mouth/Throat:      Pharynx: Oropharynx is clear  Eyes:      Conjunctiva/sclera: Conjunctivae normal    Cardiovascular:      Rate and Rhythm: Normal rate and regular rhythm  Heart sounds: No murmur heard  Pulmonary:      Effort: No respiratory distress  Breath sounds: No wheezing or rales  Abdominal:      General: There is no distension  Tenderness: There is no abdominal tenderness  There is no guarding  Musculoskeletal:      Right lower leg: No edema  Left lower leg: No edema  Skin:     General: Skin is warm and dry  Neurological:      Mental Status: She is oriented to person, place, and time  Psychiatric:         Mood and Affect: Mood normal           Discussion with Family: Updated  (son) via phone  Discharge instructions/Information to patient and family:   See after visit summary for information provided to patient and family  Provisions for Follow-Up Care:  See after visit summary for information related to follow-up care and any pertinent home health orders         Disposition:   Home    Planned Readmission: No     Discharge Statement:  I spent 35 minutes discharging the patient  This time was spent on the day of discharge  I had direct contact with the patient on the day of discharge  Greater than 50% of the total time was spent examining patient, answering all patient questions, arranging and discussing plan of care with patient as well as directly providing post-discharge instructions  Additional time then spent on discharge activities  Discharge Medications:  See after visit summary for reconciled discharge medications provided to patient and/or family        **Please Note: This note may have been constructed using a voice recognition system**

## 2021-12-22 NOTE — ASSESSMENT & PLAN NOTE
· Recent right MCA infarct at [facility] 11/2021  · No residual deficits  · Maintained on aspirin, Eliquis, statin as an outpatient  · Continue home medications on discharge

## 2021-12-22 NOTE — PLAN OF CARE
Problem: Potential for Falls  Goal: Patient will remain free of falls  Description: INTERVENTIONS:  - Educate patient/family on patient safety including physical limitations  - Instruct patient to call for assistance with activity   - Consult OT/PT to assist with strengthening/mobility   - Keep Call bell within reach  - Keep bed low and locked with side rails adjusted as appropriate  - Keep care items and personal belongings within reach  - Initiate and maintain comfort rounds  - Make Fall Risk Sign visible to staff  - Offer Toileting every 2 Hours, in advance of need  - Initiate/Maintain bed alarm  - Obtain necessary fall risk management equipment: walker  - Apply yellow socks and bracelet for high fall risk patients  - Consider moving patient to room near nurses station  Outcome: Progressing     Problem: Prexisting or High Potential for Compromised Skin Integrity  Goal: Skin integrity is maintained or improved  Description: INTERVENTIONS:  - Identify patients at risk for skin breakdown  - Assess and monitor skin integrity  - Assess and monitor nutrition and hydration status  - Monitor labs   - Assess for incontinence   - Turn and reposition patient  - Assist with mobility/ambulation  - Relieve pressure over bony prominences  - Avoid friction and shearing  - Provide appropriate hygiene as needed including keeping skin clean and dry  - Evaluate need for skin moisturizer/barrier cream  - Collaborate with interdisciplinary team   - Patient/family teaching  - Consider wound care consult   Outcome: Progressing     Problem: Nutrition/Hydration-ADULT  Goal: Nutrient/Hydration intake appropriate for improving, restoring or maintaining nutritional needs  Description: Monitor and assess patient's nutrition/hydration status for malnutrition  Collaborate with interdisciplinary team and initiate plan and interventions as ordered  Monitor patient's weight and dietary intake as ordered or per policy   Utilize nutrition screening tool and intervene as necessary  Determine patient's food preferences and provide high-protein, high-caloric foods as appropriate       INTERVENTIONS:  - Monitor oral intake, urinary output, labs, and treatment plans  - Assess nutrition and hydration status and recommend course of action  - Evaluate amount of meals eaten  - Assist patient with eating if necessary   - Allow adequate time for meals  - Recommend/ encourage appropriate diets, oral nutritional supplements, and vitamin/mineral supplements  - Order, calculate, and assess calorie counts as needed  - Recommend, monitor, and adjust tube feedings and TPN/PPN based on assessed needs  - Assess need for intravenous fluids  - Provide specific nutrition/hydration education as appropriate  - Include patient/family/caregiver in decisions related to nutrition  Outcome: Progressing

## 2021-12-22 NOTE — CASE MANAGEMENT
Case Management Discharge Planning Note    Patient name Latia Benavides  Location [facility] 5 /E5 -* MRN [id number]  : 1933 Date 2021       Current Admission Date: 2021  Current Admission Diagnosis:Liver mass   Patient Active Problem List    Diagnosis Date Noted    Liver mass 2021    Jaundice 2021    Diverticulitis 2021    Depression 2021    History of CVA (cerebrovascular accident) 2020    Atrial fibrillation ([address] ) 2020    Essential hypertension 10/13/2016      LOS (days): 8  Geometric Mean LOS (GMLOS) (days): 3 40  Days to GMLOS:-4 3     OBJECTIVE:  Risk of Unplanned Readmission Score: 14         Current admission status: Inpatient   Preferred Pharmacy:   [address] [place] [address],[address] [facility]  [address]  [address] [facility] [address]  Phone: [phone] Fax: [phone]    Primary Care Provider: Ashlyn Mckee DO    Primary Insurance: [facility] [place]  Secondary Insurance:     DISCHARGE DETAILS:    Discharge planning discussed with[de-identified] Patient  Freedom of Choice: Yes     CM contacted family/caregiver?: Yes  Were Treatment Team discharge recommendations reviewed with patient/caregiver?: Yes  Did patient/caregiver verbalize understanding of patient care needs?: Yes  Were patient/caregiver advised of the risks associated with not following Treatment Team discharge recommendations?: Yes    [address] [place] [address]         Is the patient interested in [place] [address] at discharge?: No    IMM Given (Date):: 21  IMM Given to[de-identified] Patient  IMM reviewed with patient, patient agrees with discharge determination  IMM placed in scan bin  Patient reports her son will transport her home today  No further CM needs at this time

## 2021-12-22 NOTE — ASSESSMENT & PLAN NOTE
Per CT abd/pelvis, "Lobulated low-attenuation soft tissue mass centrally within the liver with additional smaller satellite lesions   No definite primary site of tumor is identified   The enhancement pattern and presence of intrahepatic biliary ductal dilatation is nonspecific but might suggest cholangiocarcinoma as a possibility "  - GI input appreciated  - S/p MRCP, compatibility with [facility] scientific confirmed, per report,   "Dominant mass centered in the left hepatic lobe with additional satellite lesions and moderate intrahepatic biliary ductal dilatation  Farnaz Isaacs is the leading diagnostic consideration   Mildly enlarged as well as prominent though nonenlarged right upper quadrant lymph nodes may be reactive or metastatic"  - I discussed with patient's son Ismael Duarte who said family is interested in pursuing biopsy if recommended for diagnostics  - patient is status post IR liver biopsy, tolerated procedure well  - I explained to the son high suspicion for cancer and family would unlikely be seeking treatment of malignant etiologies confirmed   - patient will follow-up with GI on outpatient basis to obtain results of the liver biopsy

## 2021-12-22 NOTE — ASSESSMENT & PLAN NOTE
· CT scan revealed multiple colonic diverticula with infiltration of fat and sigmoid mesentery suggestive of diverticulitis, no evidence of perforation or abscess  · Symptoms appear to have resolved   · completed course of antibiotics

## 2023-04-12 NOTE — PROGRESS NOTES
[address] [facility]  Progress Note Nancy Carranza 1933, 80 y o  female MRN: [id number]  Unit/Bed#: E5 -01 Encounter: [id number]  Primary Care Provider: Keely Ambrose DO   Date and time admitted to hospital: 12/14/2021  1:10 PM    * Liver mass  Assessment & Plan  Per CT abd/pelvis, "Lobulated low-attenuation soft tissue mass centrally within the liver with additional smaller satellite lesions   No definite primary site of tumor is identified   The enhancement pattern and presence of intrahepatic biliary ductal dilatation is nonspecific but might suggest cholangiocarcinoma as a possibility "  - GI input appreciated  - S/p MRCP, compatibility with [facility] scientific confirmed, per report,   "Dominant mass centered in the left hepatic lobe with additional satellite lesions and moderate intrahepatic biliary ductal dilatation  Kris Wai is the leading diagnostic consideration   Mildly enlarged as well as prominent though nonenlarged right upper quadrant lymph nodes may be reactive or metastatic"  - I discussed with patient's son Yamel Garcia who said family is interested in pursuing biopsy if recommended for diagnostics  - patient is status post IR liver biopsy, tolerated procedure well  - I explained to the son high suspicion for cancer and family would unlikely be seeking treatment of malignant etiologies confirmed      History of CVA (cerebrovascular accident)  Assessment & Plan  · Recent right MCA infarct at [facility] 11/2021  · No residual deficits  · Maintained on aspirin, Eliquis, statin as an outpatient    Holding Eliquis for procedures - s/p liver biopsy 12/21, potential plan for ERCP with stenting    Essential hypertension  Assessment & Plan  · Continue metoprolol 25 mg b i d   And hydrochlorothiazide 12 5 mg daily    Atrial fibrillation (HCC)  Assessment & Plan  · Maintained on metoprolol for rate control    Anticoagulation on hold status post IR liver biopsy, will continue to hold with possibility of ERCP          VTE Pharmacologic Prophylaxis: VTE Score: 10 High Risk (Score >/= 5) - Pharmacological DVT Prophylaxis Ordered: apixaban (Eliquis)  Sequential Compression Devices Ordered  Eliquis on hold for procedures    Patient Centered Rounds: I performed bedside rounds with nursing staff today  Discussions with Specialists or Other Care Team Provider:  GI, IR    Education and Discussions with Family / Patient: Updated  (son) via phone  Time Spent for Care: 45 minutes  More than 50% of total time spent on counseling and coordination of care as described above  Current Length of Stay: 7 day(s)  Current Patient Status: Inpatient   Certification Statement: The patient will continue to require additional inpatient hospital stay due to Need for close monitoring  Discharge Plan: Anticipate discharge in 24-48 hrs to home with home services  Code Status: Level 1 - Full Code    Subjective:   Patient seen and examined  She states she feels better  Limited historian  No overnight events  Awaiting liver biopsy  Objective:     Vitals:   Temp (24hrs), Av 8 °F (36 6 °C), Min:97 7 °F (36 5 °C), Max:97 8 °F (36 6 °C)    Temp:  [97 7 °F (36 5 °C)-97 8 °F (36 6 °C)] 97 8 °F (36 6 °C)  HR:  [] 80  Resp:  [16-19] 18  BP: (111-159)/(61-89) 123/69  SpO2:  [88 %-100 %] 92 %  There is no height or weight on file to calculate BMI  Input and Output Summary (last 24 hours):   No intake or output data in the 24 hours ending 21 1647    Physical Exam:   Physical Exam  Constitutional:       General: She is not in acute distress  HENT:      Head: Normocephalic and atraumatic  Nose: No congestion  Mouth/Throat:      Pharynx: Oropharynx is clear  Eyes:      Conjunctiva/sclera: Conjunctivae normal    Cardiovascular:      Rate and Rhythm: Normal rate and regular rhythm  Heart sounds: No murmur heard  Pulmonary:      Effort: No respiratory distress  Breath sounds: No wheezing or rales  Abdominal:      General: There is no distension  Tenderness: There is no abdominal tenderness  There is no guarding  Skin:     General: Skin is warm and dry  Neurological:      Mental Status: Mental status is at baseline     Psychiatric:         Mood and Affect: Mood normal           Additional Data:     Labs:  Results from last 7 days   Lab Units 12/21/21  0312   WBC Thousand/uL 13 73*   HEMOGLOBIN g/dL 11 3*   HEMATOCRIT % 35 1   PLATELETS Thousands/uL 395*   NEUTROS PCT % 80*   LYMPHS PCT % 10*   MONOS PCT % 7   EOS PCT % 2     Results from last 7 days   Lab Units 12/21/21  0312   SODIUM mmol/L 135*   POTASSIUM mmol/L 3 8   CHLORIDE mmol/L 100   CO2 mmol/L 30   BUN mg/dL 30*   CREATININE mg/dL 0 77   ANION GAP mmol/L 5   CALCIUM mg/dL 9 3   ALBUMIN g/dL 2 1*   TOTAL BILIRUBIN mg/dL 2 60*   ALK PHOS U/L 835*   ALT U/L 57   AST U/L 104*   GLUCOSE RANDOM mg/dL 107     Results from last 7 days   Lab Units 12/21/21  0803   INR  1 47*                   Lines/Drains:  Invasive Devices  Report    Peripheral Intravenous Line            Peripheral IV 12/19/21 Left;Ventral (anterior) Forearm 1 day                      Imaging: Reviewed radiology reports from this admission including: MRI abdomen/MRCP    Recent Cultures (last 7 days):         Last 24 Hours Medication List:   Current Facility-Administered Medications   Medication Dose Route Frequency Provider Last Rate    aspirin  81 mg Oral Daily Nicole Valles MD      atorvastatin  40 mg Oral QPM Nicole Valles MD      cefTRIAXone  1,000 mg Intravenous Q24H Nicole Valles MD 1,000 mg (12/21/21 0135)    enoxaparin  40 mg Subcutaneous Q24H [address] Andrés Esqueda MD      escitalopram  5 mg Oral HS Nicole Valles MD      hydrochlorothiazide  12 5 mg Oral Daily Nicole Valles MD      melatonin  4 5 mg Oral HS Nicole Valles MD      metoprolol tartrate  25 mg Oral BID Nicole Valles MD      metroNIDAZOLE  500 mg Oral Q8H [address] Mitchel Estrada Juanpablo Izquierdo MD      ondansetron  4 mg Intravenous Q6H PRN Rachid Schumacher MD          Today, Patient Was Seen By: Margarita Lu MD    **Please Note: This note may have been constructed using a voice recognition system  ** Odomzo Pregnancy And Lactation Text: This medication is Pregnancy Category X and is absolutely contraindicated during pregnancy. It is unknown if it is excreted in breast milk.